# Patient Record
Sex: FEMALE | Race: WHITE | Employment: FULL TIME | URBAN - METROPOLITAN AREA
[De-identification: names, ages, dates, MRNs, and addresses within clinical notes are randomized per-mention and may not be internally consistent; named-entity substitution may affect disease eponyms.]

---

## 2022-09-25 ENCOUNTER — APPOINTMENT (OUTPATIENT)
Dept: GENERAL RADIOLOGY | Age: 37
DRG: 164 | End: 2022-09-25
Payer: COMMERCIAL

## 2022-09-25 ENCOUNTER — HOSPITAL ENCOUNTER (INPATIENT)
Age: 37
LOS: 2 days | Discharge: HOME | DRG: 164 | End: 2022-09-27
Attending: EMERGENCY MEDICINE | Admitting: INTERNAL MEDICINE
Payer: COMMERCIAL

## 2022-09-25 DIAGNOSIS — I26.99 BILATERAL PULMONARY EMBOLISM (HCC): Primary | ICD-10-CM

## 2022-09-25 PROBLEM — F17.200 SMOKER: Status: ACTIVE | Noted: 2022-09-25

## 2022-09-25 PROBLEM — F41.9 ANXIETY: Status: ACTIVE | Noted: 2022-09-25

## 2022-09-25 PROBLEM — G47.33 OBSTRUCTIVE SLEEP APNEA SYNDROME: Status: ACTIVE | Noted: 2022-09-25

## 2022-09-25 PROBLEM — E66.01 CLASS 3 SEVERE OBESITY IN ADULT (HCC): Status: ACTIVE | Noted: 2022-09-25

## 2022-09-25 LAB
ABSOLUTE EOS #: 0 K/UL (ref 0–0.4)
ABSOLUTE IMMATURE GRANULOCYTE: 0.11 K/UL (ref 0–0.3)
ABSOLUTE LYMPH #: 5.4 K/UL (ref 1–4.8)
ABSOLUTE MONO #: 0.53 K/UL (ref 0.1–0.8)
BASOPHILS # BLD: 0 % (ref 0–2)
BASOPHILS ABSOLUTE: 0 K/UL (ref 0–0.2)
EOSINOPHILS RELATIVE PERCENT: 0 % (ref 1–4)
HCT VFR BLD CALC: 40.6 % (ref 36.3–47.1)
HEMOGLOBIN: 13.4 G/DL (ref 11.9–15.1)
IMMATURE GRANULOCYTES: 1 %
LYMPHOCYTES # BLD: 51 % (ref 24–44)
MCH RBC QN AUTO: 27.9 PG (ref 25.2–33.5)
MCHC RBC AUTO-ENTMCNC: 33 G/DL (ref 28.4–34.8)
MCV RBC AUTO: 84.6 FL (ref 82.6–102.9)
MONOCYTES # BLD: 5 % (ref 1–7)
MORPHOLOGY: NORMAL
NRBC AUTOMATED: 0 PER 100 WBC
PARTIAL THROMBOPLASTIN TIME: 101.1 SEC (ref 20.5–30.5)
PARTIAL THROMBOPLASTIN TIME: 38.4 SEC (ref 20.5–30.5)
PARTIAL THROMBOPLASTIN TIME: 43.4 SEC (ref 20.5–30.5)
PDW BLD-RTO: 12.8 % (ref 11.8–14.4)
PLATELET # BLD: 447 K/UL (ref 138–453)
PMV BLD AUTO: 8.9 FL (ref 8.1–13.5)
PRO-BNP: 35 PG/ML
RBC # BLD: 4.8 M/UL (ref 3.95–5.11)
SEG NEUTROPHILS: 43 % (ref 36–66)
SEGMENTED NEUTROPHILS ABSOLUTE COUNT: 4.56 K/UL (ref 1.8–7.7)
TROPONIN, HIGH SENSITIVITY: 8 NG/L (ref 0–14)
WBC # BLD: 10.6 K/UL (ref 3.5–11.3)

## 2022-09-25 PROCEDURE — 93005 ELECTROCARDIOGRAM TRACING: CPT

## 2022-09-25 PROCEDURE — 99223 1ST HOSP IP/OBS HIGH 75: CPT | Performed by: INTERNAL MEDICINE

## 2022-09-25 PROCEDURE — 99222 1ST HOSP IP/OBS MODERATE 55: CPT | Performed by: SURGERY

## 2022-09-25 PROCEDURE — 96376 TX/PRO/DX INJ SAME DRUG ADON: CPT

## 2022-09-25 PROCEDURE — 6360000002 HC RX W HCPCS: Performed by: STUDENT IN AN ORGANIZED HEALTH CARE EDUCATION/TRAINING PROGRAM

## 2022-09-25 PROCEDURE — 99285 EMERGENCY DEPT VISIT HI MDM: CPT

## 2022-09-25 PROCEDURE — 85025 COMPLETE CBC W/AUTO DIFF WBC: CPT

## 2022-09-25 PROCEDURE — 2580000003 HC RX 258

## 2022-09-25 PROCEDURE — 2060000000 HC ICU INTERMEDIATE R&B

## 2022-09-25 PROCEDURE — 36415 COLL VENOUS BLD VENIPUNCTURE: CPT

## 2022-09-25 PROCEDURE — 85730 THROMBOPLASTIN TIME PARTIAL: CPT

## 2022-09-25 PROCEDURE — 71046 X-RAY EXAM CHEST 2 VIEWS: CPT

## 2022-09-25 PROCEDURE — 96365 THER/PROPH/DIAG IV INF INIT: CPT

## 2022-09-25 PROCEDURE — 84484 ASSAY OF TROPONIN QUANT: CPT

## 2022-09-25 PROCEDURE — 83880 ASSAY OF NATRIURETIC PEPTIDE: CPT

## 2022-09-25 RX ORDER — SODIUM CHLORIDE 0.9 % (FLUSH) 0.9 %
5-40 SYRINGE (ML) INJECTION PRN
Status: DISCONTINUED | OUTPATIENT
Start: 2022-09-25 | End: 2022-09-27 | Stop reason: HOSPADM

## 2022-09-25 RX ORDER — OMEPRAZOLE 40 MG/1
40 CAPSULE, DELAYED RELEASE ORAL
COMMUNITY
Start: 2022-08-19

## 2022-09-25 RX ORDER — VENLAFAXINE HYDROCHLORIDE 75 MG/1
225 CAPSULE, EXTENDED RELEASE ORAL
COMMUNITY
Start: 2022-07-11

## 2022-09-25 RX ORDER — BUSPIRONE HYDROCHLORIDE 15 MG/1
15 TABLET ORAL
COMMUNITY
Start: 2022-08-10

## 2022-09-25 RX ORDER — ACETAMINOPHEN 325 MG/1
650 TABLET ORAL EVERY 6 HOURS PRN
Status: DISCONTINUED | OUTPATIENT
Start: 2022-09-25 | End: 2022-09-27 | Stop reason: HOSPADM

## 2022-09-25 RX ORDER — SODIUM CHLORIDE 9 MG/ML
INJECTION, SOLUTION INTRAVENOUS PRN
Status: DISCONTINUED | OUTPATIENT
Start: 2022-09-25 | End: 2022-09-27 | Stop reason: HOSPADM

## 2022-09-25 RX ORDER — LAMOTRIGINE 150 MG/1
150 TABLET ORAL
COMMUNITY
Start: 2022-07-11

## 2022-09-25 RX ORDER — HEPARIN SODIUM 1000 [USP'U]/ML
80 INJECTION, SOLUTION INTRAVENOUS; SUBCUTANEOUS PRN
Status: DISCONTINUED | OUTPATIENT
Start: 2022-09-25 | End: 2022-09-26

## 2022-09-25 RX ORDER — HEPARIN SODIUM 1000 [USP'U]/ML
80 INJECTION, SOLUTION INTRAVENOUS; SUBCUTANEOUS ONCE
Status: DISCONTINUED | OUTPATIENT
Start: 2022-09-25 | End: 2022-09-25

## 2022-09-25 RX ORDER — ONDANSETRON 2 MG/ML
4 INJECTION INTRAMUSCULAR; INTRAVENOUS EVERY 6 HOURS PRN
Status: DISCONTINUED | OUTPATIENT
Start: 2022-09-25 | End: 2022-09-27 | Stop reason: HOSPADM

## 2022-09-25 RX ORDER — HEPARIN SODIUM 1000 [USP'U]/ML
40 INJECTION, SOLUTION INTRAVENOUS; SUBCUTANEOUS PRN
Status: DISCONTINUED | OUTPATIENT
Start: 2022-09-25 | End: 2022-09-26

## 2022-09-25 RX ORDER — HEPARIN SODIUM AND DEXTROSE 10000; 5 [USP'U]/100ML; G/100ML
5-30 INJECTION INTRAVENOUS CONTINUOUS
Status: DISCONTINUED | OUTPATIENT
Start: 2022-09-25 | End: 2022-09-26

## 2022-09-25 RX ORDER — ACETAMINOPHEN 650 MG/1
650 SUPPOSITORY RECTAL EVERY 6 HOURS PRN
Status: DISCONTINUED | OUTPATIENT
Start: 2022-09-25 | End: 2022-09-27 | Stop reason: HOSPADM

## 2022-09-25 RX ORDER — ONDANSETRON 4 MG/1
4 TABLET, ORALLY DISINTEGRATING ORAL EVERY 8 HOURS PRN
Status: DISCONTINUED | OUTPATIENT
Start: 2022-09-25 | End: 2022-09-27 | Stop reason: HOSPADM

## 2022-09-25 RX ORDER — POLYETHYLENE GLYCOL 3350 17 G/17G
17 POWDER, FOR SOLUTION ORAL DAILY PRN
Status: DISCONTINUED | OUTPATIENT
Start: 2022-09-25 | End: 2022-09-27 | Stop reason: HOSPADM

## 2022-09-25 RX ORDER — SODIUM CHLORIDE 0.9 % (FLUSH) 0.9 %
5-40 SYRINGE (ML) INJECTION EVERY 12 HOURS SCHEDULED
Status: DISCONTINUED | OUTPATIENT
Start: 2022-09-25 | End: 2022-09-27 | Stop reason: HOSPADM

## 2022-09-25 RX ADMIN — Medication 15 UNITS/KG/HR: at 07:17

## 2022-09-25 RX ADMIN — Medication 17 UNITS/KG/HR: at 20:51

## 2022-09-25 RX ADMIN — Medication 18 UNITS/KG/HR: at 05:36

## 2022-09-25 RX ADMIN — HEPARIN SODIUM 4720 UNITS: 1000 INJECTION INTRAVENOUS; SUBCUTANEOUS at 23:25

## 2022-09-25 RX ADMIN — SODIUM CHLORIDE, PRESERVATIVE FREE 10 ML: 5 INJECTION INTRAVENOUS at 17:05

## 2022-09-25 RX ADMIN — SODIUM CHLORIDE, PRESERVATIVE FREE 10 ML: 5 INJECTION INTRAVENOUS at 23:25

## 2022-09-25 RX ADMIN — HEPARIN SODIUM 4720 UNITS: 1000 INJECTION INTRAVENOUS; SUBCUTANEOUS at 17:01

## 2022-09-25 ASSESSMENT — ENCOUNTER SYMPTOMS
GASTROINTESTINAL NEGATIVE: 1
SHORTNESS OF BREATH: 1
EYES NEGATIVE: 1
ABDOMINAL PAIN: 0
NAUSEA: 0
VOMITING: 0
COUGH: 0
BACK PAIN: 1
SHORTNESS OF BREATH: 0

## 2022-09-25 NOTE — CARE COORDINATION
09/25/22 3610   Service Assessment   Patient Orientation Alert and Oriented;Person;Place;Situation   Cognition Alert   History Provided By Patient   Primary Caregiver Self   Accompanied By/Relationship sister [address] [place] [address] Parent; Family Members   Patient's [address] is: Legal Next of Lily Ayon   PCP Verified by CM Yes  Nikki Heimlich)   Last Visit to PCP Within last two years   Prior Functional Level Independent in ADLs/IADLs   Current Functional Level Independent in ADLs/IADLs   Can patient return to prior living arrangement Yes   Ability to make needs known: Good   Family able to assist with home care needs: Yes   Would you like for me to discuss the discharge plan with any other family members/significant others, and if so, who? Yes  (brother Zoila Lema, sister Sagrario Jay)   Social/Functional History   Lives With Family   Type of Home Apartment  (currently lives with brother in [facility], her apt. is in [place])   Bathroom Shower/Tub Tub/Shower unit;Curtain   [address]; Wheelchair-manual  (CPAP)   Receives Help From Family   ADL Assistance Independent   Ambulation Assistance Needs assistance   Transfer Assistance Needs assistance   Active  Yes   Mode of Transportation Smart Plate   Occupation Full time employment; Other(comment)  (on Mdical Leave at this time)   Type of Occupation Factory   Discharge Planning   Type of Residence Apartment   Living Arrangements Family Members   Current Services Prior To Admission [address]   DME Crutches; Wheelchair;Cpap   DME Ordered?  No   Type of Home Care Services None   Patient expects to be discharged to:   (brother's home)   Services At/After Discharge   Transition of Care Consult (CM Consult) Discharge Planning   Mode of Transport at Discharge   (sister to transport her home)   Confirm Follow Up Transport Family   Condition of Participation: Discharge Planning   The Plan for Transition of Care is related to the following treatment goals: breathe easier   The Patient and/or Patient Representative was provided with a Choice of Provider? Patient   The Patient and/Or Patient Representative agree with the Discharge Plan? Yes   Freedom of Choice list was provided with basic dialogue that supports the patient's individualized plan of care/goals, treatment preferences, and shares the quality data associated with the providers? Yes   Transitional planning-talked with patient. Plan is to go home to her brother's home. Has ride. Verified address, emergency contacts, and insurance.

## 2022-09-25 NOTE — ED NOTES
Pt resting on stretcher, in NAD, RR even and unlabored. Pt updated on plan of care. Will continue plan of care. Call light within reach.        Sera Liriano RN  09/25/22 5039

## 2022-09-25 NOTE — CONSULTS
Division of Vascular Surgery  Vascular Consult      Name: Giancarlo Luna  MRN: [id number]       Physician Requesting Consult:  Dr. Justine Buitrago    Reason for Consult:   Bilateral PE  Chief Complaint:     Right lateral chest wall pain with inspiration  Neck pain     History of Present Illness:      Giancarlo Luna is a 42 y.o.  female who presents with concerns for neck discomfort and right inferior lateral chest wall pain with inspiration. This started yesterday. CT PE study at outlying facility shows unchanged bilateral PE (from 1 week prior). No evidence of heart strain. BNP 35. Trop 8.     5 weeks ago she had ankle surgery and last weekend was diagnosed with an acute bilateral saddle PE. She states she started feeling fatigued and a pressure type sensation which prompted her to go to the doctor. She had an EKOS procedure performed and was discharged home on Eliquis. She states she has been taking her prescription twice daily as instructed. Past Medical History:     Past Medical History:   Diagnosis Date    Pulmonary embolism ([address]       Past Surgical History:     History reviewed. No pertinent surgical history. Medications Prior to Admission:       Prior to Admission medications    Medication Sig Start Date End Date Taking? Authorizing Provider   venlafaxine (EFFEXOR XR) 75 MG extended release capsule 225 mg 7/11/22  Yes Historical Provider, MD   omeprazole (PRILOSEC) 40 MG delayed release capsule 40 mg 8/19/22  Yes Historical Provider, MD   lamoTRIgine (LAMICTAL) 150 MG tablet 150 mg 7/11/22  Yes Historical Provider, MD   busPIRone (BUSPAR) 15 MG tablet 15 mg 8/10/22  Yes Historical Provider, MD   apixaban (ELIQUIS) 5 MG TABS tablet Take by mouth 2 times daily   Yes Historical Provider, MD        Allergies:       Aripiprazole    Social History:     Tobacco:    reports that she has never smoked. She has never used smokeless tobacco.  Alcohol:      has no history on file for alcohol use.   Drug Use:  reports no history of drug use. Family History:     History reviewed. No pertinent family history. Review of Systems:     Constitutional:  negative for  fevers, chills, sweats, fatigue  HEENT:  negative for vision or hearing changes  Respiratory:  negative for shortness of breath, cough, or congestion  Cardiovascular:  negative for  chest pain, palpitations  Gastrointestinal:  negative for nausea, vomiting, abdominal pain  Genitourinary:  negative for frequency, dysuria  Integument:  negative for rash, skin lesions  Chest/Breast:  Positive for right chest wall pain with inspiration. No painful inspiration or expiration  Musculoskeletal: Positive for neck pain, negative for acute joint pain  Neurological:  negative for headaches, numbness, pain and tingling extremities  Lymphatics: no lymphadenopathy or painful masses  Behavior/Psych:  negative for depression and anxiety    Physical Exam:     Vitals:  /76   Pulse 79   Temp 98.4 °F (36.9 °C) (Oral)   Resp 18   Wt 260 lb (117.9 kg)   SpO2 96%     General appearance - alert, well appearing and in no acute distress  Mental status - oriented to person, place and time with normal affect  Head - normocephalic and atraumatic  Eyes - pupils equal and reactive, extraocular eye movements intact, conjunctiva clear  Ears - hearing appears to be intact  Nose - no drainage noted  Mouth - mucous membranes moist  Neck - supple, trachea midline   Chest -  normal effort, no respiratory distress, no accessory muscle use, no wheezing  Heart - normal rate, regular rhythm, no murmurs  Abdomen - soft, non-tender, non-distended, no guarding or peritoneal signs   Neurological - normal speech, no focal findings or movement disorder noted, cranial nerves II through XII grossly intact  Extremities - no pedal edema or calf pain with palpation  Skin - no gross lesions, rashes, or induration noted    Labs   Labs are always being added and updated.   At the time of this note pertinent and available labs have been reviewed. Imaging:   CT PE from [facility] reviewed    Assessment:     Dylan Ramirez is a 42 y.o.  female with bilateral pulmonary embolism   S/[facility]OS at [facility] facility  On Eliqu   Presents with right lateral chest wall pain with inspiration    Plan: Will review CT imaging with the attending to see if the patient would benefit from any further vascular surgical interventions. Clinically, she is SATing mid 80's on RA and conversing without issues. No signs of heart strain on CT. On heparin gtt, continue for now.  Will need to be discharged on oral AC.     ----------------------------------------  Rob Clamp, DO  General Surgery PGY-4

## 2022-09-25 NOTE — ED PROVIDER NOTES
[facility] ED  Emergency Department  Emergency Medicine Resident Sign-out     Care of [facility] was assumed from Dr. Irish Kapoor and is being seen for Pulmonary Embolism  . The patient's initial evaluation and plan have been discussed with the prior provider who initially evaluated the patient. EMERGENCY DEPARTMENT COURSE / MEDICAL DECISION MAKING:       MEDICATIONS GIVEN:  Orders Placed This Encounter   Medications    DISCONTD: heparin (porcine) injection 9,430 Units    heparin (porcine) injection 9,430 Units    heparin (porcine) injection 4,720 Units    heparin 25,000 units in dextrose 5 % 250 mL infusion (rate based)       LABS / RADIOLOGY:     Labs Reviewed   APTT - Abnormal; Notable for the following components:       Result Value    .1 (*)     All other components within normal limits   CBC WITH AUTO DIFFERENTIAL - Abnormal; Notable for the following components:    Immature Granulocytes 1 (*)     Lymphocytes 51 (*)     Eosinophils % 0 (*)     Absolute Lymph # 5.40 (*)     All other components within normal limits   TROPONIN   BRAIN NATRIURETIC PEPTIDE   APTT   APTT       No results found. RECENT VITALS:     Temp: 98.4 °F (36.9 °C),  Heart Rate: 70, Resp: 18, BP: (!) 118/41, SpO2: 96 %    This patient is a 42 y.o. Female who presents as transfer from [facility] for PE. Patient with tib/fib fracture 1 month ago. Developed PE 9/19 at which time she underwent EKOS procedure and was discharged on Eliquis. Has been compliant on Eliquis. Yesterday had cramping upper back pain and was seen in the ED where she was found to have bilateral PE, unchanged from previous per radiology at [facility]. Started on heparin drip. Supratherapeutic here so heparin drip held per protocol. Vascular surgery consulted, awaiting final recommendations. Vascular surgery evaluated patient. Planning on a stat echo today and taking patient to the OR tomorrow.   Requesting medicine admit. Spoke to medicine, they agree to admit patient to stepdown. OUTSTANDING TASKS / RECOMMENDATIONS:    Follow-up vascular recommendations  Admission     FINAL IMPRESSION:     1. Bilateral pulmonary embolism (HCC)        DISPOSITION:         DISPOSITION:  []  Discharge   []  Transfer -    [x]  Admission -  Internal Medicine   []  Against Medical Advice   []  Eloped   FOLLOW-UP: No follow-up provider specified.    DISCHARGE MEDICATIONS: New Prescriptions    No medications on file           Heather Fallon DO  Emergency Medicine Resident  [place], [place]  Resident  09/25/22 4192

## 2022-09-25 NOTE — ED TRIAGE NOTES
PT presents to ED as transfer from [facility] for Bilateral PE.  MO arrived on heprin in [facility] PT A&O x4

## 2022-09-25 NOTE — ED PROVIDER NOTES
[facility] ED  Emergency Department Encounter  Emergency Medicine Resident     Pt Name: Carlos Johnson  GIF:[id number]  [place] 1985  Date of evaluation: 9/25/22  PCP:  No primary care provider on file. CHIEF COMPLAINT       Chief Complaint   Patient presents with    Pulmonary Embolism     HISTORY OF PRESENT ILLNESS  (Location/Symptom, Timing/Onset, Context/Setting, Quality, Duration, ModifyingFactors, Severity.)      Carlos Johnson is a 42 y.o. female who presents as transfer from [facility] due to bilateral PEs. Patient had right tib-fib fracture 1 month ago s/p ORIF 8/24 at [facility]. Found to have saddle PE 9/17 and admitted for EKOS procedure. Started on Eliquis and discharged 9/21. Has been compliant with Eliquis since discharge. Today had muscle spasming and cramping of upper back so return to the ED. Repeat CT showed continued bilateral PE. Per radiologist report unchanged from previous. Started on heparin drip and transferred for evaluation by vascular surgery. Denies chest pain, shortness of breath, leg swelling. PAST MEDICAL / SURGICAL / SOCIAL /FAMILY HISTORY      has a past medical history of Pulmonary embolism ([address] No other pertinent PMH on review with patient/guardian. has no past surgical history on file. No other pertinent PSH on review with patient/guardian.   Social History     Socioeconomic History    Marital status: Single     Spouse name: Not on file    Number of children: Not on file    Years of education: Not on file    Highest education level: Not on file   Occupational History    Not on file   Tobacco Use    Smoking status: Never    Smokeless tobacco: Never   Vaping Use    Vaping Use: Not on file   Substance and Sexual Activity    Alcohol use: Not on file    Drug use: Never    Sexual activity: Never   Other Topics Concern    Not on file   Social History Narrative    Not on file     Social Determinants of Health     Financial Resource Strain: Not on file   Food Insecurity: Not on file   Transportation Needs: Not on file   Physical Activity: Not on file   Stress: Not on file   Social Connections: Not on file   Intimate Partner Violence: Not on file   Housing Stability: Not on file       I counseled the patient against using tobacco products. History reviewed. No pertinent family history. No other pertinent FamHx on review with patient/guardian. Allergies:  Aripiprazole    Home Medications:  Prior to Admission medications    Medication Sig Start Date End Date Taking? Authorizing Provider   venlafaxine (EFFEXOR XR) 75 MG extended release capsule 225 mg 7/11/22  Yes Historical Provider, MD   omeprazole (PRILOSEC) 40 MG delayed release capsule 40 mg 8/19/22  Yes Historical Provider, MD   lamoTRIgine (LAMICTAL) 150 MG tablet 150 mg 7/11/22  Yes Historical Provider, MD   busPIRone (BUSPAR) 15 MG tablet 15 mg 8/10/22  Yes Historical Provider, MD   apixaban (ELIQUIS) 5 MG TABS tablet Take by mouth 2 times daily   Yes Historical Provider, MD       REVIEW OF SYSTEMS    (2-9 systems for level 4, 10 ormore for level 5)      Review of Systems   Constitutional:  Negative for fever. Eyes:  Negative for visual disturbance. Respiratory:  Negative for cough and shortness of breath. Cardiovascular:  Negative for chest pain, palpitations and leg swelling. Gastrointestinal:  Negative for abdominal pain, nausea and vomiting. Musculoskeletal:  Positive for back pain. Skin:  Negative for rash. Allergic/Immunologic: Negative for immunocompromised state. Neurological:  Negative for headaches. Hematological:  Does not bruise/bleed easily. PHYSICAL EXAM   (up to 7 for level 4, 8 or more for level 5)      INITIAL VITALS:   /76   Pulse 79   Temp 98.4 °F (36.9 °C) (Oral)   Resp 18   Wt 260 lb (117.9 kg)   SpO2 96%     Physical Exam  Constitutional:       General: She is not in acute distress. Appearance: Normal appearance. 82.6 - 102.9 fL    MCH 27.9 25.2 - 33.5 pg    MCHC 33.0 28.4 - 34.8 g/dL    RDW 12.8 11.8 - 14.4 %    Platelets 620 907 - 494 k/uL    MPV 8.9 8.1 - 13.5 fL    NRBC Automated 0.0 0.0 per 100 WBC    Seg Neutrophils PENDING %    Lymphocytes PENDING %    Monocytes PENDING %    Eosinophils % PENDING %    Basophils PENDING %    Immature Granulocytes PENDING 0 %    Segs Absolute PENDING k/uL    Absolute Lymph # PENDING k/uL    Absolute Mono # PENDING k/uL    Absolute Eos # PENDING k/uL    Basophils Absolute PENDING 0.0 - 0.2 k/uL    Absolute Immature Granulocyte PENDING 0.00 - 0.30 k/uL       IMPRESSION/MDM/ED COURSE:  42 y.o. female presented as transfer from [facility] due to bilateral PEs. Vitals WNL. On exam patient resting calmly no acute distress, nontoxic-appearing. Heart RRR, lungs clear. No respiratory distress. Vascular resident at bedside on arrival.  They will review imaging and discuss with their attending. Initial troponin at [facility] negative. Lab work there were reviewed and largely unremarkable. Will repeat troponin and obtain BNP. Pending vascular recommendations    ED Course as of 09/25/22 0711   Laura Rothman Sep 25, 2022   0612 APTT(!!):    .1(!!)  Heparin stopped.  [AF]   0612 CBC with Auto Differential:    WBC 10.6   RBC 4.80   Hemoglobin Quant 13.4   Hematocrit 40.6   MCV 84.6   MCH 27.9   MCHC 33.0   RDW 12.8   Platelet Count 897   MPV 8.9   NRBC Automated 0.0   Seg Neutrophils PENDING   Lymphocytes PENDING   Monocytes PENDING   Eosinophils % PENDING   Basophils PENDING   Immature Granulocytes PENDING   Segs Absolute PENDING   Absolute Lymph # PENDING   Absolute Mono # PENDING   Absolute Eos # PENDING   Basophils Absolute PENDING   Absolute Immature Granulocyte PENDING [AF]   0612 Brain Natriuretic Peptide:    Pro-BNP 35 [AF]   0612 Troponin:    Troponin, High Sensitivity 8 [AF]   0711 Brain Natriuretic Peptide:    Pro-BNP 35 [AF]   5083 Patient signed out to Dr. Ariel Ruano pending vascular recommendations [AF]      ED Course User Index  [AF] Vinita Herrera DO         RADIOLOGY:  No orders to display       EKG  EKG Interpretation    Interpreted by me    Rhythm: normal sinus   Rate: normal  Axis: normal  Ectopy: none  Conduction: normal  ST Segments: no acute change  T Waves: no acute change  Q Waves: none    Clinical Impression: no acute changes and normal EKG    All EKG's are interpreted by the Emergency Department Physician who either signs or Co-signs this chart in the absence of a cardiologist.    PROCEDURES:  None    CONSULTS:  IP CONSULT TO VASCULAR SURGERY    FINAL IMPRESSION      1. Bilateral pulmonary embolism ([address]          DISPOSITION / PLAN     DISPOSITION Decision To Admit 09/25/2022 05:45:05 AM    PATIENT REFERREDTO:  No follow-up provider specified.     DISCHARGE MEDICATIONS:  New Prescriptions    No medications on file       Albina Casas DO  PGY 3  Resident Physician Emergency Medicine  09/25/22 7:11 AM    (Please note that portions of this note were completed with a voice recognition program.Efforts were made to edit the dictations but occasionally words are mis-transcribed.)       Vinita Herrera DO  Resident  09/25/22 0065

## 2022-09-25 NOTE — ED PROVIDER NOTES
[facility]     Emergency Department     Faculty Attestation    I performed a history and physical examination of the patient and discussed management with the resident. I reviewed the residents note and agree with the documented findings and plan of care. Any areas of disagreement are noted on the chart. I was personally present for the key portions of any procedures. I have documented in the chart those procedures where I was not present during the key portions. I have reviewed the emergency nurses triage note. I agree with the chief complaint, past medical history, past surgical history, allergies, medications, social and family history as documented unless otherwise noted below. For Physician Assistant/ Nurse Practitioner cases/documentation I have personally evaluated this patient and have completed at least one if not all key elements of the E/M (history, physical exam, and MDM). Additional findings are as noted. I have personally seen and evaluated the patient. I find the patient's history and physical exam are consistent with the NP/PA documentation. I agree with the care provided, treatment rendered, disposition and follow-up plan. 49-year-old female transferred from [facility] for bilateral PEs. Patient was recently diagnosed with saddle PE, underwent EKOS at [facility]. PEs were provoked after right tib-fib injury with immobilization. Patient was feeling improved after EKOS, discharged on Eliquis. Started having shoulder spasms and difficulty breathing tonight. Went back to [facility], and CT showed bilateral PEs. Patient transferred for evaluation. Exam:  General: Sitting on the bed, awake, alert, and in no acute distress  CV: normal rate and regular rhythm  Lungs: Breathing comfortably on room air with no tachypnea, hypoxia, or increased work of breathing    Plan:  Cardiac enzymes without evidence of right heart strain.   Will consult vascular surgery with concern for failure of DOAC  Continue heparin drip, admit for further management    EKG: Interpreted by myself: EKG with normal sinus rhythm, 80 bpm.  Normal axis. No ST segment elevation or depression. Normal intervals. T wave inversion in lead V1, flattening in lead III. Nonspecific EKG. No prior available for comparison.     Michael Hu MD   Attending Emergency Physician    (Please note that portions of this note were completed with a voice recognition program. Efforts were made to edit the dictations but occasionally words are mis-transcribed.)            Michael Hu MD  09/25/22 3314

## 2022-09-25 NOTE — H&P
will get stat echo. Review of Systems   Constitutional: Negative. HENT: Negative. Eyes: Negative. Respiratory:  Positive for shortness of breath. Cardiovascular:  Positive for chest pain (with inspiration on Rt). Gastrointestinal: Negative. Endocrine: Negative. Genitourinary: Negative. Musculoskeletal:  Positive for back pain. Skin: Negative. Neurological: Negative. Psychiatric/Behavioral: Negative. PAST MEDICAL HISTORY     Past Medical History:   Diagnosis Date    Pulmonary embolism ([facility] [address]        PAST SURGICAL HISTORY     History reviewed. No pertinent surgical history. ALLERGIES     Aripiprazole    MEDICATIONS PRIOR TO ADMISSION     Prior to Admission medications    Medication Sig Start Date End Date Taking? Authorizing Provider   venlafaxine (EFFEXOR XR) 75 MG extended release capsule 225 mg 22  Yes Historical Provider, MD   omeprazole (PRILOSEC) 40 MG delayed release capsule 40 mg 22  Yes Historical Provider, MD   lamoTRIgine (LAMICTAL) 150 MG tablet 150 mg 22  Yes Historical Provider, MD   busPIRone (BUSPAR) 15 MG tablet 15 mg 8/10/22  Yes Historical Provider, MD   apixaban (ELIQUIS) 5 MG TABS tablet Take by mouth 2 times daily   Yes Historical Provider, MD       SOCIAL HISTORY     Tobacco: Never. vape occassionally  Alcohol: None  Illicits: None  Occupation: Not on file    FAMILY HISTORY     History reviewed. No pertinent family history. PHYSICAL EXAM     Vitals: BP (!) 118/41   Pulse 70   Temp 98.4 °F (36.9 °C) (Oral)   Resp 18   Wt 260 lb (117.9 kg)   SpO2 96%   Tmax: Temp (24hrs), Av.4 °F (36.9 °C), Min:98.4 °F (36.9 °C), Max:98.4 °F (36.9 °C)    Last Body weight:   Wt Readings from Last 3 Encounters:   22 260 lb (117.9 kg)     Body Mass Index : There is no height or weight on file to calculate BMI. Physical Exam  Constitutional:       Appearance: She is obese. HENT:      Head: Normocephalic and atraumatic.       Nose: Nose normal.      Mouth/Throat:      Mouth: Mucous membranes are moist.      Pharynx: Oropharynx is clear. Eyes:      Extraocular Movements: Extraocular movements intact. Conjunctiva/sclera: Conjunctivae normal.      Pupils: Pupils are equal, round, and reactive to light. Cardiovascular:      Rate and Rhythm: Normal rate and regular rhythm. Pulses: Normal pulses. Heart sounds: Normal heart sounds. Pulmonary:      Breath sounds: No decreased air movement. Examination of the right-middle field reveals rhonchi. Examination of the left-middle field reveals rhonchi. Examination of the right-lower field reveals decreased breath sounds. Examination of the left-lower field reveals rhonchi. Decreased breath sounds, rhonchi and rales present. Abdominal:      General: Bowel sounds are normal.      Palpations: Abdomen is soft. Musculoskeletal:         General: Normal range of motion. Cervical back: Normal range of motion and neck supple. Thoracic back: Spasms (upper back and shoulders) present. Right lower leg: Deformity (Surgical scar) present. Neurological:      General: No focal deficit present. Mental Status: She is alert and oriented to person, place, and time.             INVESTIGATIONS     Laboratory Testing:     Recent Results (from the past 24 hour(s))   APTT    Collection Time: 09/25/22  5:37 AM   Result Value Ref Range    .1 (HH) 20.5 - 30.5 sec   Troponin    Collection Time: 09/25/22  5:37 AM   Result Value Ref Range    Troponin, High Sensitivity 8 0 - 14 ng/L   Brain Natriuretic Peptide    Collection Time: 09/25/22  5:37 AM   Result Value Ref Range    Pro-BNP 35 <300 pg/mL   CBC with Auto Differential    Collection Time: 09/25/22  5:37 AM   Result Value Ref Range    WBC 10.6 3.5 - 11.3 k/uL    RBC 4.80 3.95 - 5.11 m/uL    Hemoglobin 13.4 11.9 - 15.1 g/dL    Hematocrit 40.6 36.3 - 47.1 %    MCV 84.6 82.6 - 102.9 fL    MCH 27.9 25.2 - 33.5 pg    MCHC 33.0 28.4 - 34.8 g/dL    RDW 12.8 11.8 - 14.4 %    Platelets 563 291 - 234 k/uL    MPV 8.9 8.1 - 13.5 fL    NRBC Automated 0.0 0.0 per 100 WBC    Immature Granulocytes 1 (H) 0 %    Seg Neutrophils 43 36 - 66 %    Lymphocytes 51 (H) 24 - 44 %    Monocytes 5 1 - 7 %    Eosinophils % 0 (L) 1 - 4 %    Basophils 0 0 - 2 %    Absolute Immature Granulocyte 0.11 0.00 - 0.30 k/uL    Segs Absolute 4.56 1.8 - 7.7 k/uL    Absolute Lymph # 5.40 (H) 1.0 - 4.8 k/uL    Absolute Mono # 0.53 0.1 - 0.8 k/uL    Absolute Eos # 0.00 0.0 - 0.4 k/uL    Basophils Absolute 0.00 0.0 - 0.2 k/uL    Morphology Normal        Imaging:   No results found. ASSESSMENT & PLAN     ASSESSMENT / PLAN:     IMPRESSION  This is a 42 y.o. female who presented with shortness of breath and chest pain and found to have bilateral PEs. Patient admitted for management and surgical intervention for bilateral PEs. Pulmonary embolism, bilateral ([address]  - Initial saddle embolus on 09/19 s/p ORIF for right tibia-fibular fracture  - Was on Eliquis 5 mg twice daily post PE for last 1 week  - Repeat CT significant for bilateral PEs unchanged from previous scans  - Chest x-ray shows mild elevation of the right hemidiaphragm and mild right basilar atelectasis. - Started on heparin drip  - Vascular surgery on board, ordered echo and planning on surgical intervention tomorrow morning. We will follow further recommendations from them  - Monitor oxygen saturation with pulse oximetry  - Monitor hemodynamics    Hx of TAMRA  - Diagnosed 2018  - Has CPAP at home but does not use      Anxiety  - As per patient, she use buspirone, venlafaxine and lamotrigine at home  - Will confirm complete psych history and resume medications accordingly. Diet: Regular adult diet.  N.p.o. from midnight  DVT ppx: Heparin drip  GI ppx: Not indicated    PT/OT/SW: Consulted  Discharge Planning:  to assist with discharge planning    Gera Pascal MD  Internal Medicine Resident, PGY-1  [facility]. [facility];  [place], [place]  9/25/2022, 10:47 AM

## 2022-09-26 ENCOUNTER — ANESTHESIA EVENT (OUTPATIENT)
Dept: CARDIAC CATH/INVASIVE PROCEDURES | Age: 37
DRG: 164 | End: 2022-09-26
Payer: COMMERCIAL

## 2022-09-26 ENCOUNTER — ANESTHESIA (OUTPATIENT)
Dept: CARDIAC CATH/INVASIVE PROCEDURES | Age: 37
DRG: 164 | End: 2022-09-26
Payer: COMMERCIAL

## 2022-09-26 LAB
ABSOLUTE EOS #: 0.24 K/UL (ref 0–0.44)
ABSOLUTE IMMATURE GRANULOCYTE: 0.07 K/UL (ref 0–0.3)
ABSOLUTE LYMPH #: 3.26 K/UL (ref 1.1–3.7)
ABSOLUTE MONO #: 0.69 K/UL (ref 0.1–1.2)
ALBUMIN SERPL-MCNC: 3.5 G/DL (ref 3.5–5.2)
ALBUMIN/GLOBULIN RATIO: 1 (ref 1–2.5)
ALP BLD-CCNC: 90 U/L (ref 35–104)
ALT SERPL-CCNC: 16 U/L (ref 5–33)
ANION GAP SERPL CALCULATED.3IONS-SCNC: 8 MMOL/L (ref 9–17)
AST SERPL-CCNC: 11 U/L
BASOPHILS # BLD: 1 % (ref 0–2)
BASOPHILS ABSOLUTE: 0.09 K/UL (ref 0–0.2)
BILIRUB SERPL-MCNC: 0.3 MG/DL (ref 0.3–1.2)
BILIRUBIN DIRECT: 0.1 MG/DL
BILIRUBIN, INDIRECT: 0.2 MG/DL (ref 0–1)
BUN BLDV-MCNC: 12 MG/DL (ref 6–20)
CALCIUM SERPL-MCNC: 9.4 MG/DL (ref 8.6–10.4)
CHLORIDE BLD-SCNC: 102 MMOL/L (ref 98–107)
CO2: 24 MMOL/L (ref 20–31)
CREAT SERPL-MCNC: 0.74 MG/DL (ref 0.5–0.9)
EKG ATRIAL RATE: 80 BPM
EKG P AXIS: 38 DEGREES
EKG P-R INTERVAL: 150 MS
EKG Q-T INTERVAL: 398 MS
EKG QRS DURATION: 98 MS
EKG QTC CALCULATION (BAZETT): 459 MS
EKG R AXIS: 34 DEGREES
EKG T AXIS: 34 DEGREES
EKG VENTRICULAR RATE: 80 BPM
EOSINOPHILS RELATIVE PERCENT: 3 % (ref 1–4)
GFR AFRICAN AMERICAN: >60 ML/MIN
GFR NON-AFRICAN AMERICAN: >60 ML/MIN
GFR SERPL CREATININE-BSD FRML MDRD: ABNORMAL ML/MIN/{1.73_M2}
GLUCOSE BLD-MCNC: 109 MG/DL (ref 70–99)
HCG, PREGNANCY URINE (POC): NEGATIVE
HCT VFR BLD CALC: 40.5 % (ref 36.3–47.1)
HEMOGLOBIN: 13.2 G/DL (ref 11.9–15.1)
IMMATURE GRANULOCYTES: 1 %
INR BLD: 1
LACTIC ACID, WHOLE BLOOD: 1.4 MMOL/L (ref 0.7–2.1)
LV EF: 55 %
LVEF MODALITY: NORMAL
LYMPHOCYTES # BLD: 36 % (ref 24–43)
MCH RBC QN AUTO: 28 PG (ref 25.2–33.5)
MCHC RBC AUTO-ENTMCNC: 32.6 G/DL (ref 28.4–34.8)
MCV RBC AUTO: 85.8 FL (ref 82.6–102.9)
MONOCYTES # BLD: 8 % (ref 3–12)
NRBC AUTOMATED: 0 PER 100 WBC
PARTIAL THROMBOPLASTIN TIME: 44.5 SEC (ref 20.5–30.5)
PARTIAL THROMBOPLASTIN TIME: 45.8 SEC (ref 20.5–30.5)
PDW BLD-RTO: 12.9 % (ref 11.8–14.4)
PLATELET # BLD: 381 K/UL (ref 138–453)
PMV BLD AUTO: 8.6 FL (ref 8.1–13.5)
POTASSIUM SERPL-SCNC: 4.2 MMOL/L (ref 3.7–5.3)
PROTHROMBIN TIME: 10.4 SEC (ref 9.1–12.3)
RBC # BLD: 4.72 M/UL (ref 3.95–5.11)
SEG NEUTROPHILS: 51 % (ref 36–65)
SEGMENTED NEUTROPHILS ABSOLUTE COUNT: 4.64 K/UL (ref 1.5–8.1)
SODIUM BLD-SCNC: 134 MMOL/L (ref 135–144)
TOTAL PROTEIN: 7 G/DL (ref 6.4–8.3)
WBC # BLD: 9 K/UL (ref 3.5–11.3)

## 2022-09-26 PROCEDURE — 36014 PLACE CATHETER IN ARTERY: CPT

## 2022-09-26 PROCEDURE — 99232 SBSQ HOSP IP/OBS MODERATE 35: CPT | Performed by: INTERNAL MEDICINE

## 2022-09-26 PROCEDURE — C1753 CATH, INTRAVAS ULTRASOUND: HCPCS

## 2022-09-26 PROCEDURE — 93010 ELECTROCARDIOGRAM REPORT: CPT | Performed by: INTERNAL MEDICINE

## 2022-09-26 PROCEDURE — 2580000003 HC RX 258: Performed by: STUDENT IN AN ORGANIZED HEALTH CARE EDUCATION/TRAINING PROGRAM

## 2022-09-26 PROCEDURE — 80076 HEPATIC FUNCTION PANEL: CPT

## 2022-09-26 PROCEDURE — 02CR3ZZ EXTIRPATION OF MATTER FROM LEFT PULMONARY ARTERY, PERCUTANEOUS APPROACH: ICD-10-PCS | Performed by: SURGERY

## 2022-09-26 PROCEDURE — 81025 URINE PREGNANCY TEST: CPT

## 2022-09-26 PROCEDURE — 93306 TTE W/DOPPLER COMPLETE: CPT

## 2022-09-26 PROCEDURE — C1757 CATH, THROMBECTOMY/EMBOLECT: HCPCS

## 2022-09-26 PROCEDURE — 7100000000 HC PACU RECOVERY - FIRST 15 MIN

## 2022-09-26 PROCEDURE — 6360000002 HC RX W HCPCS: Performed by: STUDENT IN AN ORGANIZED HEALTH CARE EDUCATION/TRAINING PROGRAM

## 2022-09-26 PROCEDURE — 80048 BASIC METABOLIC PNL TOTAL CA: CPT

## 2022-09-26 PROCEDURE — 2720000010 HC SURG SUPPLY STERILE

## 2022-09-26 PROCEDURE — 10701625 HC MISC CATH THROMBECTOMY

## 2022-09-26 PROCEDURE — 85730 THROMBOPLASTIN TIME PARTIAL: CPT

## 2022-09-26 PROCEDURE — C1769 GUIDE WIRE: HCPCS

## 2022-09-26 PROCEDURE — 02CQ3ZZ EXTIRPATION OF MATTER FROM RIGHT PULMONARY ARTERY, PERCUTANEOUS APPROACH: ICD-10-PCS | Performed by: SURGERY

## 2022-09-26 PROCEDURE — 10701625 HC MISC GUIDEWIRE

## 2022-09-26 PROCEDURE — 2060000000 HC ICU INTERMEDIATE R&B

## 2022-09-26 PROCEDURE — 85610 PROTHROMBIN TIME: CPT

## 2022-09-26 PROCEDURE — B31SZZZ FLUOROSCOPY OF RIGHT PULMONARY ARTERY: ICD-10-PCS | Performed by: SURGERY

## 2022-09-26 PROCEDURE — B31TZZZ FLUOROSCOPY OF LEFT PULMONARY ARTERY: ICD-10-PCS | Performed by: SURGERY

## 2022-09-26 PROCEDURE — 10701625 HC MISC CATH INTRAVASCULAR ULTRASOUND

## 2022-09-26 PROCEDURE — 10701625 HC SURG SUPPLY STERILE

## 2022-09-26 PROCEDURE — 7100000001 HC PACU RECOVERY - ADDTL 15 MIN

## 2022-09-26 PROCEDURE — 37184 PRIM ART M-THRMBC 1ST VSL: CPT

## 2022-09-26 PROCEDURE — 85025 COMPLETE CBC W/AUTO DIFF WBC: CPT

## 2022-09-26 PROCEDURE — 10701625 HC NON-CHARGEABLE SUPPLY

## 2022-09-26 PROCEDURE — 75743 ARTERY X-RAYS LUNGS: CPT

## 2022-09-26 PROCEDURE — 6360000002 HC RX W HCPCS

## 2022-09-26 PROCEDURE — 83605 ASSAY OF LACTIC ACID: CPT

## 2022-09-26 PROCEDURE — 7100000010 HC PHASE II RECOVERY - FIRST 15 MIN: Performed by: SURGERY

## 2022-09-26 PROCEDURE — 36415 COLL VENOUS BLD VENIPUNCTURE: CPT

## 2022-09-26 PROCEDURE — 2500000003 HC RX 250 WO HCPCS

## 2022-09-26 PROCEDURE — 2709999900 HC NON-CHARGEABLE SUPPLY

## 2022-09-26 PROCEDURE — XXE3X27 MEASUREMENT OF PULMONARY ARTERY FLOW, COMPUTER-AIDED TRIAGE AND NOTIFICATION, NEW TECHNOLOGY GROUP 7: ICD-10-PCS | Performed by: SURGERY

## 2022-09-26 PROCEDURE — 7100000011 HC PHASE II RECOVERY - ADDTL 15 MIN: Performed by: SURGERY

## 2022-09-26 RX ORDER — OXYCODONE HYDROCHLORIDE 5 MG/1
5 TABLET ORAL EVERY 4 HOURS PRN
Status: DISCONTINUED | OUTPATIENT
Start: 2022-09-26 | End: 2022-09-27 | Stop reason: HOSPADM

## 2022-09-26 RX ORDER — FENTANYL CITRATE 50 UG/ML
INJECTION, SOLUTION INTRAMUSCULAR; INTRAVENOUS PRN
Status: DISCONTINUED | OUTPATIENT
Start: 2022-09-26 | End: 2022-09-26 | Stop reason: SDUPTHER

## 2022-09-26 RX ORDER — MIDAZOLAM HYDROCHLORIDE 1 MG/ML
INJECTION INTRAMUSCULAR; INTRAVENOUS PRN
Status: DISCONTINUED | OUTPATIENT
Start: 2022-09-26 | End: 2022-09-26 | Stop reason: SDUPTHER

## 2022-09-26 RX ORDER — IODIXANOL 320 MG/ML
INJECTION, SOLUTION INTRAVASCULAR
Status: DISPENSED
Start: 2022-09-26 | End: 2022-09-27

## 2022-09-26 RX ORDER — SODIUM CHLORIDE 9 MG/ML
INJECTION, SOLUTION INTRAVENOUS CONTINUOUS
Status: DISCONTINUED | OUTPATIENT
Start: 2022-09-26 | End: 2022-09-26

## 2022-09-26 RX ORDER — SODIUM CHLORIDE, SODIUM LACTATE, POTASSIUM CHLORIDE, CALCIUM CHLORIDE 600; 310; 30; 20 MG/100ML; MG/100ML; MG/100ML; MG/100ML
INJECTION, SOLUTION INTRAVENOUS CONTINUOUS
Status: DISCONTINUED | OUTPATIENT
Start: 2022-09-26 | End: 2022-09-27

## 2022-09-26 RX ORDER — SODIUM CHLORIDE 0.9 % (FLUSH) 0.9 %
5-40 SYRINGE (ML) INJECTION EVERY 12 HOURS SCHEDULED
Status: DISCONTINUED | OUTPATIENT
Start: 2022-09-26 | End: 2022-09-27 | Stop reason: HOSPADM

## 2022-09-26 RX ORDER — FENTANYL CITRATE 50 UG/ML
25 INJECTION, SOLUTION INTRAMUSCULAR; INTRAVENOUS EVERY 5 MIN PRN
Status: DISCONTINUED | OUTPATIENT
Start: 2022-09-26 | End: 2022-09-27 | Stop reason: HOSPADM

## 2022-09-26 RX ORDER — KETAMINE HYDROCHLORIDE 10 MG/ML
INJECTION, SOLUTION INTRAMUSCULAR; INTRAVENOUS PRN
Status: DISCONTINUED | OUTPATIENT
Start: 2022-09-26 | End: 2022-09-26 | Stop reason: SDUPTHER

## 2022-09-26 RX ORDER — SODIUM CHLORIDE 9 MG/ML
INJECTION, SOLUTION INTRAVENOUS PRN
Status: DISCONTINUED | OUTPATIENT
Start: 2022-09-26 | End: 2022-09-27 | Stop reason: HOSPADM

## 2022-09-26 RX ORDER — ACETAMINOPHEN 325 MG/1
650 TABLET ORAL
Status: DISCONTINUED | OUTPATIENT
Start: 2022-09-26 | End: 2022-09-27 | Stop reason: HOSPADM

## 2022-09-26 RX ORDER — LIDOCAINE HYDROCHLORIDE 10 MG/ML
INJECTION, SOLUTION INFILTRATION; PERINEURAL
Status: DISPENSED
Start: 2022-09-26 | End: 2022-09-27

## 2022-09-26 RX ORDER — BUSPIRONE HYDROCHLORIDE 15 MG/1
15 TABLET ORAL 3 TIMES DAILY
Status: DISCONTINUED | OUTPATIENT
Start: 2022-09-27 | End: 2022-09-27 | Stop reason: HOSPADM

## 2022-09-26 RX ORDER — SODIUM CHLORIDE 0.9 % (FLUSH) 0.9 %
5-40 SYRINGE (ML) INJECTION PRN
Status: DISCONTINUED | OUTPATIENT
Start: 2022-09-26 | End: 2022-09-27 | Stop reason: HOSPADM

## 2022-09-26 RX ORDER — ENOXAPARIN SODIUM 150 MG/ML
1 INJECTION SUBCUTANEOUS 2 TIMES DAILY
Status: DISCONTINUED | OUTPATIENT
Start: 2022-09-26 | End: 2022-09-27

## 2022-09-26 RX ORDER — PROPOFOL 10 MG/ML
INJECTION, EMULSION INTRAVENOUS
Status: DISPENSED
Start: 2022-09-26 | End: 2022-09-27

## 2022-09-26 RX ADMIN — KETAMINE HYDROCHLORIDE 10 MG: 10 INJECTION INTRAMUSCULAR; INTRAVENOUS at 18:12

## 2022-09-26 RX ADMIN — ENOXAPARIN SODIUM 120 MG: 150 INJECTION SUBCUTANEOUS at 22:26

## 2022-09-26 RX ADMIN — FENTANYL CITRATE 25 MCG: 50 INJECTION, SOLUTION INTRAMUSCULAR; INTRAVENOUS at 18:18

## 2022-09-26 RX ADMIN — HEPARIN SODIUM 4720 UNITS: 1000 INJECTION INTRAVENOUS; SUBCUTANEOUS at 15:10

## 2022-09-26 RX ADMIN — KETAMINE HYDROCHLORIDE 10 MG: 10 INJECTION INTRAMUSCULAR; INTRAVENOUS at 17:57

## 2022-09-26 RX ADMIN — Medication 21 UNITS/KG/HR: at 08:44

## 2022-09-26 RX ADMIN — MIDAZOLAM 1 MG: 1 INJECTION INTRAMUSCULAR; INTRAVENOUS at 17:57

## 2022-09-26 RX ADMIN — FENTANYL CITRATE 25 MCG: 50 INJECTION, SOLUTION INTRAMUSCULAR; INTRAVENOUS at 18:12

## 2022-09-26 RX ADMIN — FENTANYL CITRATE 50 MCG: 50 INJECTION, SOLUTION INTRAMUSCULAR; INTRAVENOUS at 18:30

## 2022-09-26 RX ADMIN — SODIUM CHLORIDE, POTASSIUM CHLORIDE, SODIUM LACTATE AND CALCIUM CHLORIDE: 600; 310; 30; 20 INJECTION, SOLUTION INTRAVENOUS at 07:27

## 2022-09-26 RX ADMIN — MIDAZOLAM 1 MG: 1 INJECTION INTRAMUSCULAR; INTRAVENOUS at 17:36

## 2022-09-26 RX ADMIN — HEPARIN SODIUM 4720 UNITS: 1000 INJECTION INTRAVENOUS; SUBCUTANEOUS at 07:16

## 2022-09-26 RX ADMIN — SODIUM CHLORIDE, PRESERVATIVE FREE 10 ML: 5 INJECTION INTRAVENOUS at 22:28

## 2022-09-26 RX ADMIN — SODIUM CHLORIDE, PRESERVATIVE FREE 10 ML: 5 INJECTION INTRAVENOUS at 22:27

## 2022-09-26 RX ADMIN — KETAMINE HYDROCHLORIDE 10 MG: 10 INJECTION INTRAMUSCULAR; INTRAVENOUS at 18:01

## 2022-09-26 NOTE — PROGRESS NOTES
Division of Vascular Surgery         Progress Note      Name: Naima Currie  MRN: [id number]         Overnight Events:      No acute events overnight. Hospital Summary      Patient presented to ED transfer from [facility] 5 weeks out after ankle ORIF for tib/fib fracture. She presented with neck pain and right chest pain with inspiration. CT at outside facility showed bilateral pulmonary embolism, unchanged from 1 week prior. She was started on Eliquis, which she was compliant with twice daily. Reports no difficulties sleeping. Respirations slightly elevated overnight, 23-31/min. Subjective:     Patient examined bedside today. She states her neck discomfort is not present this morning. She has been ambulating to and from the restroom without difficulty. Not currently on supplemental oxygen. Denies any leg pain. Denies chest pain, SOB, fever, chills, nausea, or vomiting. She notes she has been NPO since last night. BNP/trops wnl. Her only pertinent past medical history if the recent pulmonary embolism and sleep apnea, for which she uses CPAP at home. Home medications include Effexor  mg, omeprazole delayed release 40 mg, Lamictal 150 mg, buspirone 15 mg, and Eliquis 5 mg BID. Current medications include continuous heparin IV 5-30 units/kg/hour. Allergy to Aripiprazole. Physical Exam:     Vitals:  BP 82/72   Pulse 73   Temp 98.2 °F (36.8 °C) (Oral)   Resp 21   Ht 5' 4\" (1.626 m)   Wt 257 lb 4.4 oz (116.7 kg)   SpO2 96%   BMI 44.16 kg/m²     Physical Exam  Constitutional: Patient lying supine in hospital bed. NAD, cooperative. HENT: NC/AT. Bilateral external ears without erythema or lesions. No rhinorrhea. Eyes: No ductal or lacrimal discharge, bilaterally. EOM intact, PERRLA. Neck: Trachea midline, supple. Cardiovascular: RRR per monitor   Pulmonary: Chest rise and fall symmetric without increased work of breathing. Integument: Skin warm and dry.  No jaundice, erythema,

## 2022-09-26 NOTE — PROGRESS NOTES
Physical Therapy        Physical Therapy Cancel Note      DATE: 2022    NAME: Dolores Garcia  MRN: [id number]   : 1985      Patient not seen this date for Physical Therapy due to:    Surgery/Procedure: Plan for thrombectomy this date, will check back post-op.        Electronically signed by Bard Julieth PT on 2022 at 8:36 AM

## 2022-09-26 NOTE — OP NOTE
Operative Note      Patient: Maye Scott  YOB: 1985  MRN: [id number]    Date of Procedure: 9/26/2022    Pre-Op Diagnosis: saddle pulmonary embolism with symtoms of shortness of breath on exertion    Post-Op Diagnosis: Same       Procedure:  1. Right common femoral venous access under ultrasound guidance. 2.  Placement of a catheter in the pulmonary artery with measurement of pulmonary artery pressures to be 34 mmHg. 3.  Pulmonary arteriography. 4.  Extirpation of clot from the pulmonary artery using the Inari T 24 catheter on the left upper and lower pulmonary arteries. 5.  Extirpation of clot from the pulmonary artery using the T 24 Inari catheter on the right upper and lower pulmonary arteries. 6.  Completion pulmonary arteriography. 7.  Measurement of pulmonary artery pressures following extirpation of clot at 26 mmHg. Surgeon(s):  MD Roosevelt Ruff [place]    Assistant:   * No surgical staff found *    Anesthesia: General    Estimated Blood Loss (mL): 80    Complications: None    Specimens:   * No specimens in log *    Implants:  * No implants in log *      Drains: * No LDAs found *    Findings: Extirpation of thrombus was achieved. 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 Completion pulmonary arteriography revealed no further thrombus within the artery. Peak systolic arterial pressure was in the pulmonary artery preextirpation was 34 mmHg. Post extirpation of clot the pressure was 26 mmHg during systole. Although there was ectopy there was no loss of blood pressure. She did not have evidence of hemoptysis. Detailed Description of Procedure:     Ms Maurice Suarez was brought to the catheterization suite for mechanical thrombectomy of bilateral pulmonary embolus with right heart strain. After appropriate timeout performed, bilateral groin sites prepped and draped in standard sterile fashion.   I began by evaluating the right common femoral vein under ultrasound, it was patent and compressible. Local anesthesia delivered and under ultrasound guidance using a micropuncture needle, the right common femoral vein was accessed, permanent ultrasound images were saved. The track was dilated and 2 proglide sutures placed in a preclose fashion. An 8-Swedish sheath inserted and flushed. With an amplatz wire in, I up sized to a 22-[employer]ish [employer] sheath and parked it in the inferior vena cava. Patient was maintained on heparin drip and 5000 units of heparin bolus given. Using an angled pig tail catheter I selectively catheterized the main pulmonary artery. Pulmonary artery pressures measured and recorded. Next I selected the right pulmonary artery and placed a stiff amplatz wire down into it. The Inari T24 FloTriever catheter was then positioned and mechanical thrombectomy performed of the right lower and upper pulmonary arteries with thrombus extirpated removing significant clot burden. Right lower and upper pulmonary artery angiogram performed, revealing brisk filling of the right pulmonary tree without filling defects. Next I selected the left lower pulmonary artery and with a stiff amplatz wire down, I repositioned the Inari T24 FloTriever catheter to the left side. Mechanical thrombectomy of the left lower and upper pulmonary arteries performed and thrombus extirpated removing significant clot burden. Left lower and upper pulmonary artery angiogram performed revealing brisk filling of the left pulmonary tree. PA pressure measured again and recorded. Please note that the Kenyatta Settler was used to return most of the blood removed back to the patient per standard protocol. The catheter and sheath were removed, and the right common femoral vein was successfully closed percutaneously from the previously placed proglide sutures. Dermabond and Band-Aid applied, patient tolerated procedure well and was transferred to the floor for recovery.      Electronically signed by Manjinder Gaffney MD on 9/26/2022 at 6:35 PM

## 2022-09-26 NOTE — ANESTHESIA POSTPROCEDURE EVALUATION
Department of Anesthesiology  Postprocedure Note    Patient: Cresencio Ormond  MRN: [id number]  [place]: 1985  Date of evaluation: 9/26/2022      Procedure Summary     Date: 09/26/22 Room / Location: [address]    Anesthesia Start: 1732 Anesthesia Stop: 1854    Procedure: FEMORAL THROMBECTOMY Diagnosis:       PE (pulmonary thromboembolism) ([address]      (PE)    Surgeons: Army Darron MD Responsible Provider: Claudene Clay, MD    Anesthesia Type: MAC ASA Status: 3          Anesthesia Type: No value filed.     Stephane Phase I:      Stephane Phase II:        Anesthesia Post Evaluation    Patient location during evaluation: bedside  Patient participation: complete - patient participated  Level of consciousness: awake  Airway patency: patent  Nausea & Vomiting: no nausea and no vomiting  Complications: no  Cardiovascular status: hemodynamically stable  Respiratory status: acceptable  Hydration status: stable  Comments: BP (!) 156/89   Pulse (!) 103   Temp 98.2 °F (36.8 °C) (Oral)   Resp 21   Ht 5' 4\" (1.626 m)   Wt 257 lb 4.4 oz (116.7 kg)   SpO2 93%   BMI 44.16 kg/m²

## 2022-09-26 NOTE — PROGRESS NOTES
Patients home meds not ordered on admission. Paged Dr. Michael Walsh to review. Home meds restarted.

## 2022-09-26 NOTE — PROGRESS NOTES
spasms. This morning her symptoms worsened and she went back to the [facility] ER. In the ED she was found to have bilateral PE on the CT scans unchanged from previous scans. She was started on heparin drip and transferred to [facility] for evaluation by vascular surgery. Currently in the ER patient is still complaining of right lateral chest wall pain exacerbated with inspiration. She also complains of shoulder and upper back pain more pronounced on the right side. She denies central chest pain, nausea and dizziness. Her saturation is 96% on ambient air with no tachypnea or tachycardia. Admission EKG nonsignificant, no right heart strain evidence. She also has a history of sleep apnea but she denies using CPAP at home. She has a positive psych history and use venlafaxine, lamotrigine and buspirone at home. She is already seen by vascular surgery and planning on surgical intervention. In the meantime patient continues to be on heparin drip and will get stat echo. OBJECTIVE     Vital Signs:  BP 82/72   Pulse 73   Temp 98.2 °F (36.8 °C) (Oral)   Resp 21   Ht 5' 4\" (1.626 m)   Wt 257 lb 4.4 oz (116.7 kg)   SpO2 96%   BMI 44.16 kg/m²     Temp (24hrs), Av.3 °F (36.8 °C), Min:97.9 °F (36.6 °C), Max:98.7 °F (37.1 °C)    No intake/output data recorded. Physical Exam  Constitutional:       Appearance: She is obese. HENT:      Head: Normocephalic and atraumatic. Nose: Nose normal.      Mouth/Throat:      Mouth: Mucous membranes are moist.      Pharynx: Oropharynx is clear. Eyes:      Extraocular Movements: Extraocular movements intact. Conjunctiva/sclera: Conjunctivae normal.      Pupils: Pupils are equal, round, and reactive to light. Cardiovascular:      Rate and Rhythm: Normal rate and regular rhythm. Pulses: Normal pulses. Heart sounds: Normal heart sounds. Pulmonary:      Breath sounds: No decreased air movement.  Examination of the right-middle field reveals rhonchi. Examination of the left-middle field reveals rhonchi. Examination of the right-lower field reveals decreased breath sounds. Examination of the left-lower field reveals rhonchi. Decreased breath sounds, rhonchi and rales present. Abdominal:      General: Bowel sounds are normal.      Palpations: Abdomen is soft. Musculoskeletal:         General: Normal range of motion. Cervical back: Normal range of motion and neck supple. Thoracic back: Spasms (upper back and shoulders) present. Right lower leg: Deformity (Surgical scar) present. Neurological:      General: No focal deficit present. Mental Status: She is alert and oriented to person, place, and time. Medications:  Scheduled Medications:    sodium chloride flush  5-40 mL IntraVENous 2 times per day     Continuous Infusions:    heparin (PORCINE) Infusion 19 Units/kg/hr (09/25/22 2326)    sodium chloride       PRN Medicationsheparin (porcine), 80 Units/kg, PRN  heparin (porcine), 40 Units/kg, PRN  sodium chloride flush, 5-40 mL, PRN  sodium chloride, , PRN  ondansetron, 4 mg, Q8H PRN   Or  ondansetron, 4 mg, Q6H PRN  polyethylene glycol, 17 g, Daily PRN  acetaminophen, 650 mg, Q6H PRN   Or  acetaminophen, 650 mg, Q6H PRN        Diagnostic Labs:  CBC:   Recent Labs     09/25/22  0537 09/26/22  0556   WBC 10.6 9.0   RBC 4.80 4.72   HGB 13.4 13.2   HCT 40.6 40.5   MCV 84.6 85.8   RDW 12.8 12.9    381     BMP:   Recent Labs     09/26/22  0556   *   K 4.2      CO2 24   BUN 12   CREATININE 0.74     BNP: No results for input(s): BNP in the last 72 hours. PT/INR:   Recent Labs     09/26/22  0556   PROTIME 10.4   INR 1.0     APTT:   Recent Labs     09/25/22  1519 09/25/22  2220 09/26/22  0556   APTT 38.4* 43.4* 44.5*     CARDIAC ENZYMES: No results for input(s): CKMB, CKMBINDEX, TROPONINI in the last 72 hours.     Invalid input(s): CKTOTAL;3  FASTING LIPID PANEL:No results found for: CHOL, HDL, TRIG  LIVER PROFILE:   Recent Labs     09/26/22  0556   AST 11   ALT 16   BILIDIR 0.1   BILITOT 0.3   ALKPHOS 90      MICROBIOLOGY: No results found for: CULTURE    Imaging:    XR CHEST (2 VW)    Result Date: 9/25/2022  Mild elevation of the right hemidiaphragm and mild right basilar atelectasis       ASSESSMENT & PLAN     IMPRESSION  This is a 42 y.o. female who presented with shortness of breath and chest pain and found to have bilateral PEs. Patient admitted for management and surgical intervention for bilateral PEs. ASSESSMENT / PLAN:      Pulmonary embolism, bilateral ([address]  - Initial saddle embolus on 09/19 s/p ORIF for right tibia-fibular fracture  - Was on Eliquis 5 mg twice daily post PE for last 1 week  - Repeat CT significant for bilateral PEs unchanged from previous scans  - Chest x-ray shows mild elevation of the right hemidiaphragm and mild right basilar atelectasis. - Started on heparin drip  - Vascular surgery on board, ordered stat echo and planning on surgical intervention today. We will follow for further recommendations  - Monitor oxygen saturation with pulse oximetry  - Monitor hemodynamics     Hx of TAMRA  - Diagnosed 2018  - Has CPAP at home but does not use      Anxiety  - As per patient, she use buspirone, venlafaxine and lamotrigine at home  - We will resume her home medications.        Diet: N.p.o. from midnight  DVT ppx: Heparin drip  GI ppx: Not indicated     PT/OT/SW: Consulted  Discharge Planning:  to assist with discharge planning      Sara Hoskins MD  Internal Medicine Resident, PGY-1  [place], [place].  9/26/2022, 6:55 AM

## 2022-09-26 NOTE — ANESTHESIA PRE PROCEDURE
Department of Anesthesiology  Preprocedure Note       Name:  Aretha Andrews   Age:  42 y.o.  :  1985                                          MRN:  [id number]         Date:  2022      Surgeon: Jovanna Coon):  Mar Barraza MD    Procedure: Procedure(s): FEMORAL THROMBECTOMY    Medications prior to admission:   Prior to Admission medications    Medication Sig Start Date End Date Taking?  Authorizing Provider   venlafaxine (EFFEXOR XR) 75 MG extended release capsule 225 mg 22  Yes Historical Provider, MD   omeprazole (PRILOSEC) 40 MG delayed release capsule 40 mg 22  Yes Historical Provider, MD   lamoTRIgine (LAMICTAL) 150 MG tablet 150 mg 22  Yes Historical Provider, MD   busPIRone (BUSPAR) 15 MG tablet 15 mg 8/10/22  Yes Historical Provider, MD   apixaban (ELIQUIS) 5 MG TABS tablet Take by mouth 2 times daily   Yes Historical Provider, MD       Current medications:    Current Facility-Administered Medications   Medication Dose Route Frequency Provider Last Rate Last Admin    lactated ringers infusion   IntraVENous Continuous Nonda Robert DO 75 mL/hr at 22 0727 New Bag at 22 0727    [START ON 2022] busPIRone (BUSPAR) tablet 15 mg  15 mg Oral TID Vanessa Ingram MD        lamoTRIgine (LAMICTAL) tablet 150 mg  150 mg Oral Daily MD Keya Roberts ON 2022] venlafaxine (EFFEXOR XR) extended release capsule 225 mg  225 mg Oral Daily with breakfast Vanessa Ingram MD        heparin (porcine) injection 9,430 Units  80 Units/kg IntraVENous PRN Jaye Kanner, DO        heparin (porcine) injection 4,720 Units  40 Units/kg IntraVENous PRN Jaye Kanner, DO   4,720 Units at 22 1510    heparin 25,000 units in dextrose 5 % 250 mL infusion (rate based)  5-30 Units/kg/hr IntraVENous Continuous Jaye Kanner, DO 27.1 mL/hr at 22 1508 23 Units/kg/hr at 22 1508    sodium chloride flush 0.9 % injection 5-40 mL  5-40 mL IntraVENous 2 times per day Ny Panda MD   10 mL at 09/25/22 2325    sodium chloride flush 0.9 % injection 5-40 mL  5-40 mL IntraVENous PRN Ny Panda MD        0.9 % sodium chloride infusion   IntraVENous PRN Ny Panda MD        ondansetron (ZOFRAN-ODT) disintegrating tablet 4 mg  4 mg Oral Q8H PRN Ny Panda MD        Or    ondansetron [facility]) injection 4 mg  4 mg IntraVENous Q6H PRN Ny Panda MD        polyethylene glycol [facility]) packet 17 g  17 g Oral Daily PRN Ny Panda MD        acetaminophen (TYLENOL) tablet 650 mg  650 mg Oral Q6H PRN Ny Panda MD        Or    acetaminophen (TYLENOL) suppository 650 mg  650 mg Rectal Q6H PRN Ny Panda MD           Allergies:     Allergies   Allergen Reactions    Aripiprazole      Restless and aggitated       Problem List:    Patient Active Problem List   Diagnosis Code    Bilateral pulmonary embolism (HCC) I26.99    Anxiety F41.9    Obstructive sleep apnea syndrome G47.33    Class 3 severe obesity in adult ([facility] [address] E66.01    Smoker F17.200       Past Medical History:        Diagnosis Date    Anticoagulated     ELIQUIS    Anxiety     Broken ankle 08/19/2022    Current smoker     Depression     H/O blood clots     s/p ankle surgery  /  acute bilateral saddle PE    EKOS / [facility]TICOAGULATED [facility]    Pulmonary embolism ([facility] [address] 09/25/2022    Sleep apnea in adult 2018    has cpap at home / does not use       Past Surgical History:        Procedure Laterality Date    ORIF ANKLE FRACTURE  08/24/2022    Tib/Fib fx    THROMBECTOMY  09/26/2022    DR TORRES       Social History:    Social History     Tobacco Use    Smoking status: Never    Smokeless tobacco: Never   Substance Use Topics    Alcohol use: Not on file                                Counseling given: Not Answered      Vital Signs (Current):   Vitals:    09/25/22 2330 09/26/22 0426 09/26/22 0718 09/26/22 1115   BP: 126/75 82/72 105/63 (!) 156/89   Pulse: 79 73 79 (!) 103   Resp: 25 21 21 21   Temp: 97.9 °F (36.6 °C) 98.2 °F (36.8 °C) 98.6 °F (37 °C) 98.2 °F (36.8 °C)   TempSrc: Oral Oral Oral Oral   SpO2: 96% 96% 97% 93%   Weight:       Height:                                                  BP Readings from Last 3 Encounters:   09/26/22 (!) 156/89       NPO Status:                                                                                 BMI:   Wt Readings from Last 3 Encounters:   09/25/22 257 lb 4.4 oz (116.7 kg)     Body mass index is 44.16 kg/m². CBC:   Lab Results   Component Value Date/Time    WBC 9.0 09/26/2022 05:56 AM    RBC 4.72 09/26/2022 05:56 AM    HGB 13.2 09/26/2022 05:56 AM    HCT 40.5 09/26/2022 05:56 AM    MCV 85.8 09/26/2022 05:56 AM    RDW 12.9 09/26/2022 05:56 AM     09/26/2022 05:56 AM       CMP:   Lab Results   Component Value Date/Time     09/26/2022 05:56 AM    K 4.2 09/26/2022 05:56 AM     09/26/2022 05:56 AM    CO2 24 09/26/2022 05:56 AM    BUN 12 09/26/2022 05:56 AM    CREATININE 0.74 09/26/2022 05:56 AM    GFRAA >60 09/26/2022 05:56 AM    LABGLOM >60 09/26/2022 05:56 AM    GLUCOSE 109 09/26/2022 05:56 AM    PROT 7.0 09/26/2022 05:56 AM    CALCIUM 9.4 09/26/2022 05:56 AM    BILITOT 0.3 09/26/2022 05:56 AM    ALKPHOS 90 09/26/2022 05:56 AM    AST 11 09/26/2022 05:56 AM    ALT 16 09/26/2022 05:56 AM       POC Tests: No results for input(s): POCGLU, POCNA, POCK, POCCL, POCBUN, POCHEMO, POCHCT in the last 72 hours. Coags:   Lab Results   Component Value Date/Time    PROTIME 10.4 09/26/2022 05:56 AM    INR 1.0 09/26/2022 05:56 AM    APTT 45.8 09/26/2022 01:52 PM       HCG (If Applicable): No results found for: PREGTESTUR, PREGSERUM, HCG, HCGQUANT     ABGs: No results found for: PHART, PO2ART, DHR5XLX, TMJ5TZY, BEART, R0YAZQMK     Type & Screen (If Applicable):  No results found for: LABABO, LABRH    Drug/Infectious Status (If Applicable):  No results found for: HIV, HEPCAB    COVID-19 Screening (If Applicable):  No results found for: [address] [facility]        Anesthesia Evaluation  Patient summary reviewed no history of anesthetic complications:   Airway: Mallampati: III  TM distance: >3 FB   Neck ROM: full  Mouth opening: > = 3 FB   Dental: normal exam         Pulmonary:normal exam    (+) sleep apnea:                             Cardiovascular:  Exercise tolerance: poor (<4 METS),               Echocardiogram reviewed               ROS comment: Normal RV function on echo. Neuro/Psych:   Negative Neuro/Psych ROS              GI/Hepatic/Renal: Neg GI/Hepatic/Renal ROS            Endo/Other:    (+) blood dyscrasia: anticoagulation therapy:., .                 Abdominal:   (+) obese,           Vascular:   + PE.        ROS comment: Recent orthopedic surgery. Diagnosed with PE post-operatively, currently anticoagulated. . Other Findings:           Anesthesia Plan      MAC     ASA 3       Induction: intravenous. Anesthetic plan and risks discussed with patient. Use of blood products discussed with patient whom consented to blood products. Plan discussed with CRNA.                     Sylvia Barnhart MD   9/26/2022

## 2022-09-26 NOTE — PROGRESS NOTES
Patient arrives to pcc room 8 from 540 pre-op with urine pregnancy completed / pain free. Anesthesia in to see. Zayra Peters from surgery notified that patient arrived and ready. Family at bedside. Assessment reviewed. Consent previously signed. Side rails up with call light to reach.   Tongue ring and pj bottoms removed and given to [address] sister in law

## 2022-09-27 VITALS
DIASTOLIC BLOOD PRESSURE: 78 MMHG | SYSTOLIC BLOOD PRESSURE: 113 MMHG | TEMPERATURE: 98.4 F | WEIGHT: 255.07 LBS | OXYGEN SATURATION: 97 % | BODY MASS INDEX: 43.55 KG/M2 | RESPIRATION RATE: 21 BRPM | HEART RATE: 101 BPM | HEIGHT: 64 IN

## 2022-09-27 LAB
ABSOLUTE EOS #: 0.21 K/UL (ref 0–0.44)
ABSOLUTE IMMATURE GRANULOCYTE: 0.03 K/UL (ref 0–0.3)
ABSOLUTE LYMPH #: 2.77 K/UL (ref 1.1–3.7)
ABSOLUTE MONO #: 0.75 K/UL (ref 0.1–1.2)
ANION GAP SERPL CALCULATED.3IONS-SCNC: 12 MMOL/L (ref 9–17)
BASOPHILS # BLD: 1 % (ref 0–2)
BASOPHILS ABSOLUTE: 0.06 K/UL (ref 0–0.2)
BUN BLDV-MCNC: 8 MG/DL (ref 6–20)
CALCIUM SERPL-MCNC: 9.5 MG/DL (ref 8.6–10.4)
CHLORIDE BLD-SCNC: 103 MMOL/L (ref 98–107)
CO2: 21 MMOL/L (ref 20–31)
CREAT SERPL-MCNC: 0.68 MG/DL (ref 0.5–0.9)
EOSINOPHILS RELATIVE PERCENT: 3 % (ref 1–4)
GFR AFRICAN AMERICAN: >60 ML/MIN
GFR NON-AFRICAN AMERICAN: >60 ML/MIN
GFR SERPL CREATININE-BSD FRML MDRD: ABNORMAL ML/MIN/{1.73_M2}
GLUCOSE BLD-MCNC: 106 MG/DL (ref 70–99)
HCT VFR BLD CALC: 40.2 % (ref 36.3–47.1)
HEMOGLOBIN: 13.5 G/DL (ref 11.9–15.1)
IMMATURE GRANULOCYTES: 0 %
LYMPHOCYTES # BLD: 34 % (ref 24–43)
MCH RBC QN AUTO: 28.2 PG (ref 25.2–33.5)
MCHC RBC AUTO-ENTMCNC: 33.6 G/DL (ref 28.4–34.8)
MCV RBC AUTO: 84.1 FL (ref 82.6–102.9)
MONOCYTES # BLD: 9 % (ref 3–12)
NRBC AUTOMATED: 0 PER 100 WBC
PDW BLD-RTO: 12.6 % (ref 11.8–14.4)
PLATELET # BLD: 271 K/UL (ref 138–453)
PMV BLD AUTO: 8.2 FL (ref 8.1–13.5)
POTASSIUM SERPL-SCNC: 3.8 MMOL/L (ref 3.7–5.3)
RBC # BLD: 4.78 M/UL (ref 3.95–5.11)
SEG NEUTROPHILS: 53 % (ref 36–65)
SEGMENTED NEUTROPHILS ABSOLUTE COUNT: 4.35 K/UL (ref 1.5–8.1)
SODIUM BLD-SCNC: 136 MMOL/L (ref 135–144)
WBC # BLD: 8.2 K/UL (ref 3.5–11.3)

## 2022-09-27 PROCEDURE — 97166 OT EVAL MOD COMPLEX 45 MIN: CPT

## 2022-09-27 PROCEDURE — 6370000000 HC RX 637 (ALT 250 FOR IP): Performed by: STUDENT IN AN ORGANIZED HEALTH CARE EDUCATION/TRAINING PROGRAM

## 2022-09-27 PROCEDURE — 97535 SELF CARE MNGMENT TRAINING: CPT

## 2022-09-27 PROCEDURE — 80048 BASIC METABOLIC PNL TOTAL CA: CPT

## 2022-09-27 PROCEDURE — 2580000003 HC RX 258: Performed by: STUDENT IN AN ORGANIZED HEALTH CARE EDUCATION/TRAINING PROGRAM

## 2022-09-27 PROCEDURE — 85025 COMPLETE CBC W/AUTO DIFF WBC: CPT

## 2022-09-27 PROCEDURE — 99232 SBSQ HOSP IP/OBS MODERATE 35: CPT | Performed by: INTERNAL MEDICINE

## 2022-09-27 PROCEDURE — 36415 COLL VENOUS BLD VENIPUNCTURE: CPT

## 2022-09-27 RX ADMIN — APIXABAN 10 MG: 5 TABLET, FILM COATED ORAL at 09:25

## 2022-09-27 RX ADMIN — BUSPIRONE HYDROCHLORIDE 15 MG: 15 TABLET ORAL at 09:22

## 2022-09-27 RX ADMIN — VENLAFAXINE HYDROCHLORIDE 225 MG: 150 CAPSULE, EXTENDED RELEASE ORAL at 09:23

## 2022-09-27 RX ADMIN — LAMOTRIGINE 150 MG: 100 TABLET ORAL at 09:23

## 2022-09-27 RX ADMIN — SODIUM CHLORIDE, PRESERVATIVE FREE 10 ML: 5 INJECTION INTRAVENOUS at 10:00

## 2022-09-27 ASSESSMENT — PAIN SCALES - GENERAL
PAINLEVEL_OUTOF10: 0
PAINLEVEL_OUTOF10: 2

## 2022-09-27 NOTE — PROGRESS NOTES
RECOVERY PHASE INITIATED / PARS 9 as patient returns to [facility] room 8 alert and awake. Handoff at bedside with anesthesia. Right groin site clean soft and dry/bandaid intact. Some redness to abd fold above right puncture site. Assessment further reviewed. Heparin gtt continues. No complaints of pain. Fluids offered.   Side rails up with writer at bedside

## 2022-09-27 NOTE — PROGRESS NOTES
Patient discharged. Paperwork given and explained. IVs removed x3. Patient instructed to make follow up appointments and get jose from [address] in [facility]. Patient taken out in wheelchair. Left unit with all belongings.

## 2022-09-27 NOTE — FLOWSHEET NOTE
Pt hypotensive, pt stated ankle was numb, notified provider. Provider did assess at bedside, No new orders.

## 2022-09-27 NOTE — CARE COORDINATION
Transitional Planning  Spoke with patient. Plan to return home with brother. Has ride. Denies further needs.

## 2022-09-27 NOTE — PROGRESS NOTES
Division of Vascular Surgery         Progress Note      Name: Srini Soto  MRN: [id number]         Overnight Events:      Nurse noted slight hypotensive episode overnight, as well as acute left ankle numbness, vascular notified and assessed. Hospital Summary      Patient presented to ED transfer from [facility] 5 weeks out after ankle ORIF for tib/fib fracture. She presented with neck pain and right chest pain with inspiration. CT at outside facility showed bilateral pulmonary embolism, unchanged from 1 week prior. She was started on Eliquis, which she was compliant with twice daily. Reports no difficulties sleeping. On 9/26, patient underwent extirpation of clot from right and left upper and lower pulmonary arteries with completion of pulmonary arteriography, right common femoral venous access. Subjective:     Patient examined bedside today, awakened from sleep. Patient able to ambulate to restroom overnight, no issues with urination. Patient reports her breathing has improved. Notes feeling tired, attributed to long surgery day yesterday. Denies fever, chills, nausea, vomiting, CP, SOB, or new onset cough. Her only pertinent past medical history is the recent pulmonary embolism, and sleep apnea for which she uses CPAP at home. Current medications include buspirone 15 mg tab TID, Effexor  mg capsule PO am, Lovenox 1 mg/kg subQ BID, Lamictal 150 mg tab PO QD. Home medications include Effexor  mg, omeprazole delayed release 40 mg, Lamictal 150 mg, buspirone 15 mg, and Eliquis 5 mg BID. Allergy to Aripiprazole. Physical Exam:     Vitals:  /65   Pulse 72   Temp 98.4 °F (36.9 °C) (Oral)   Resp 20   Ht 5' 4\" (1.626 m)   Wt 257 lb 4.4 oz (116.7 kg)   SpO2 93%   BMI 44.16 kg/m²     Physical Exam  Constitutional: Patient lying supine in hospital bed, slightly tired as she was just awoken from sleep. NAD, cooperative. HENT: NC/AT.  Bilateral external ears without erythema or lesions. No rhinorrhea. Eyes: No ductal or lacrimal discharge, bilaterally. EOM intact, PERRLA. Neck: Trachea midline, supple. Cardiovascular: RRR per monitor. Pulmonary: Chest rise and fall symmetric without increased work of breathing. Integument: Skin warm and dry. No jaundice, erythema, rashes, lesions. Peripheral Vascular: No peripheral cyanosis noted. No lower extremity edema, bilaterally. Dorsalis pedis and tibialis posterior pulses 2+ bilaterally. Wound Site: Bandage in place, no drainage present. Minimal ecchymosis. Surrounding skin is soft. Neurological: A&Ox3, responsive to all verbal stimuli. Psychiatric: Mood and behavior normal.     Imaging/Labs:     CMP 9/27 05:06: Sodium 136 mmol/L, Potassium 3.8 mmol//L, Chloride 103 mmol/L, BUN 8 mg/dL, Creatinine 0.68 mg/dL, GFR>60 mL/min, Glucose 106 mg/Dl, Calcium 9.5 mg/dL  CBC 9/27 05:06: WBC 8.2 k/uL, RBC 4.78 m/uL, Hgb 13.5 g/dL, Hct 40.2%    Intraoperative Findings 9/26:   - Extirpation of saddle thrombus achieved. - No further thrombus via completion of pulmonary arteriography. - Peak systolic arterial pressure before extirpation was 34 mmHg; after extirpation of thrombus was 26 mmHg.   - No loss of blood pressure.  - No evidence of hemoptysis    CXR 2 Views 9/25: \"Mild elevation of the right hemidiaphragm and mild right basilar atelectasis\"    Assessment/Plan:     Ok to Transition to oral anticoagulation  Echo reviewed  Appreciate case management and social work assistance in determining affordability of oral anticoagulation options    Electronically signed by LILLI Lindo-S2 on 9/27/22 at 6:50 AM EDT

## 2022-09-27 NOTE — PROGRESS NOTES
History  Lives With: Family (Brother, SONIA, 2 nephews (8 and 10 yo))  Type of Home: House  Home Layout: Two level, Performs ADL's on one level, Able to Live on Main level with bedroom/bathroom  Bathroom Shower/Tub: Tub/Shower unit, Curtain  Bathroom Toilet: Standard  Bathroom Equipment: Grab bars in shower, Shower chair  Bathroom Accessibility: Accessible  Home Equipment: [id number] [employer], [employer], [employer], 4 wheeled  Receives Help From: Family  ADL Assistance: Independent  Homemaking Assistance: Needs assistance  Homemaking Responsibilities: No (Brother and brother's family performing all chores for past 5 weeks since pt injured RLE)  Ambulation Assistance: Independent (using 4ww mainly however)  Transfer Assistance: Independent  Active : No  Patient's  Info: Pt hasn't driven for past 5 weeks, Sister-In-Law assisting  Mode of Transportation: SUV, Family  Education: [employer]  Occupation: Full time employment  Type of Occupation: Factory-on medical leave currently  Leisure & Hobbies: shopping  Additional Comments: 24 hr sup available in home per pt       Objective   SpO2: 97 %   Room air          Safety Devices  Type of Devices: Gait belt;Call light within reach; Left in chair;Patient at risk for falls  Restraints  Restraints Initially in Place: No  Bed Mobility Training  Bed Mobility Training: Yes  Supine to Sit: Supervision  Sit to Supine: Other (comment) (Pt supine in bed upon arrival, retired sitting in recliner at exit)  Scooting: Modified independent  Balance  Sitting: Intact (mod I while sitting dynamically on EOB to don CAM boot and LB clothing, sat for ~8 min unsupported)  Standing: With support (SUP required, pt stood bedside/func mob using RW, R CAM boot on, stood for ~2 min only)  Gait  Overall Level of Assistance: Stand-by assistance (Func mob around room this date)  Base of Support: Narrowed  Speed/Roxann: Pace decreased (< 100 feet/min); Shuffled; Slow  Step Length: Right (09/27/22 1138)      Goals  Short Term Goals  Time Frame for Short term goals: Pt will by discharge  Short Term Goal 1: demo ADL UB/LB dressing/bathing activity at mod I, increased time  Short Term Goal 2: demo activity tolerance for 35 min+  Short Term Goal 3: demo/identify 2 EC/WS techniques during func activity with 1 vc  Short Term Goal 4: demo (I) with all bed mob/func transfers, including from toilet       Therapy Time   Individual Concurrent Group Co-treatment   Time In 0849         Time Out 0909         Minutes 20         Timed Code Treatment Minutes: [facility], OTR/L

## 2022-09-27 NOTE — PLAN OF CARE
Problem: Discharge Planning  Goal: Discharge to home or other facility with appropriate resources  Outcome: Completed  Flowsheets (Taken 9/27/2022 0800)  Discharge to home or other facility with appropriate resources: Identify barriers to discharge with patient and caregiver     Problem: Safety - Adult  Goal: Free from fall injury  Outcome: Completed     Problem: ABCDS Injury Assessment  Goal: Absence of physical injury  Outcome: Completed     Problem: Pain  Goal: Verbalizes/displays adequate comfort level or baseline comfort level  Outcome: Completed

## 2022-09-27 NOTE — DISCHARGE INSTRUCTIONS
start taking rivaroxaban 15 mg twice daily for 3 weeks and 20 mg daily there after as preScribed. Follow up with PCP and Vascular surgery after discharge. Pulmonary Embolism: Care Instructions  Overview     Pulmonary embolism is the sudden blockage of an artery in the lung. Blood clots in the deep veins of the leg or pelvis (deep vein thrombosis, or DVT) are the most common cause. These blood clots can travel to the lungs. Pulmonary embolism can be very serious. Because you have had one pulmonary embolism, you are at greater risk for having another one. But you can take steps to prevent another pulmonary embolism. You will probably take a prescription blood-thinning medicine to prevent blood clots. A blood thinner can stop a blood clot from growing larger and prevent new clots from forming. Follow-up care is a key part of your treatment and safety. Be sure to make and go to all appointments, and call your doctor if you are having problems. It's also a good idea to know your test results and keep a list of the medicines you take. How can you care for yourself at home? Take your medicines exactly as prescribed. Call your doctor if you think you are having a problem with your medicine. You will get more details on the specific medicines your doctor prescribes. If you are taking a blood thinner, be sure you get instructions about how to take your medicine safely. Blood thinners can cause serious bleeding problems. Try to walk several times a day. Walking helps keep blood moving in your legs. Before doing other types of exercise, ask your doctor what type and level of exercise is safe for you. Take steps to help prevent blood clots in your legs. For example:  Exercise your lower leg muscles if you sit for long periods of time. Pump your feet up and down by pulling your toes up toward your knees then pointing them down. Repeat. After an illness or surgery, try to get up and out of bed often.  If you can't get out of bed, flex your feet every hour to keep the blood moving through your legs. Take plenty of breaks when you travel. On long car trips, stop the car and walk around every hour or so. On the bus, plane, or train, get out of your seat and walk up and down the aisle every hour, if you can. Wear compression stockings if your doctor recommends them. Check with your doctor about whether you should use hormonal forms of birth control or hormone therapy. These may increase your risk of blood clots. Have a healthy lifestyle. This includes being active, staying at a healthy weight, and not smoking. When should you call for help? Call 911 anytime you think you may need emergency care. For example, call if:    You have shortness of breath. You have chest pain. You passed out (lost consciousness). You cough up blood. Call your doctor now or seek immediate medical care if:    You have new or worsening pain or swelling in your leg. Watch closely for changes in your health, and be sure to contact your doctor if:    You do not get better as expected. Where can you learn more? Go to [email or web address].[email or web address].[email or web address] org and sign in to your GamePress account. Enter E282 in the [facility] box to learn more about \"Pulmonary Embolism: Care Instructions. \"     If you do not have an account, please click on the \"Sign Up Now\" link. Current as of: March 28, 2022               Content Version: 13.4  © 2006-2022 [employer]. Care instructions adapted under license by [facility] ([facility]). If you have questions about a medical condition or this instruction, always ask your healthcare professional. [place] [address] any warranty or liability for your use of this information. 8 Things You Can Do to Help Prevent Blood Clots  A blood clot in a deep vein, usually in the legs, is called a deep vein thrombosis (DVT).  A DVT can break loose and travel through the bloodstream to the lungs. Then the clot can block blood flow in the lungs (pulmonary embolism). This can be life-threatening. That's why it's important to take steps to prevent a clot. Take a blood-thinning medicine (called an anticoagulant), if prescribed. If your doctor prescribes medicine, take it as directed. Exercise your lower leg muscles. This helps keep the blood moving through your legs. Pump your feet up and down by pulling your toes up toward your knees then pointing them down. Repeat. This is a good exercise to do when you are sitting for long periods of time. Get up out of bed as soon as your doctor says it's okay. Being active is one of the best things you can do to prevent clots. Take plenty of breaks when you travel. On long car trips, stop the car and walk around every hour or so. On the bus, plane, or train, get out of your seat and walk up and down the aisle every hour, if you can. Be active. Try to get 30 minutes or more of activity on most days of the week. Don't smoke. Smoking can increase your risk of blood clots. If you need help quitting, talk to your doctor about stop-smoking programs and medicines. Check with your doctor about whether you should use hormone forms of birth control or hormone therapy. These may increase your risk of blood clots. Use compression stockings if your doctor prescribes them. These are specially fitted stockings that may prevent blood clots by keeping blood from pooling in your legs. Current as of: March 28, 2022               Content Version: 13.4  © 2006-2022 Healthwise, [facility]. Care instructions adapted under license by [facility] ([facility]). If you have questions about a medical condition or this instruction, always ask your healthcare professional. [place] [address] any warranty or liability for your use of this information.            rivaroxaban  Pronunciation:  JASON a MALIKA a ban  Brand:  Xarelto  What is the most important information I should know about rivaroxaban? Do not stop taking rivaroxaban without first talking to your doctor. Stopping suddenly can increase your risk of blood clot or stroke. Call your doctor at once if you have signs of bleeding such as: headaches, feeling very weak or dizzy, bleeding gums, nosebleeds, heavy menstrual periods or abnormal vaginal bleeding, blood in your urine, bloody or tarry stools, coughing up blood or vomit that looks like coffee grounds. Many other drugs can increase your risk of bleeding when used with rivaroxaban. Tell your doctor about all medicines you have recently used. Rivaroxaban can cause a very serious blood clot around your spinal cord if you undergo a spinal tap or receive spinal anesthesia (epidural). Tell any doctor who treats you that you are taking rivaroxaban. What is rivaroxaban? Rivaroxaban is used to treat or prevent blood clots (venous thromboembolism, or VTE). Blood clots can occur in the legs (deep vein thrombosis, DVT) or the lungs (pulmonary embolism, PE). Blood clots can develop when you are very ill and cannot move around as much as normal, such as during or after a stay in the hospital. Blood clots may also develop after knee or hip replacement surgery. Rivaroxaban is sometimes used to lower your risk of a blood clot coming back after you have received treatment for blood clots for at least 6 months. Rivaroxaban is also used in people with atrial fibrillation (a heart rhythm disorder) to lower the risk of stroke caused by a blood clot. Rivaroxaban is also given together with aspirin to lower the risk of stroke, heart attack, or other serious heart problems in people with coronary artery disease (decreased blood flow to the heart) or peripheral artery disease (decreased blood flow to the legs). Rivaroxaban may also be used for purposes not listed in this medication guide.   What should I discuss with my healthcare provider before taking rivaroxaban? You should not use rivaroxaban if you are allergic to it, or if you have active or uncontrolled bleeding. Rivaroxaban can cause a very serious blood clot around your spinal cord if you undergo a spinal tap or receive spinal anesthesia (epidural). This type of blood clot could cause long-term paralysis, and may be more likely to occur if:    you have a genetic spinal defect;  you have a spinal catheter in place;  you have a history of spinal surgery or repeated spinal taps;  you have recently had a spinal tap or epidural anesthesia;  you are taking an NSAID--Advil, Aleve, Motrin, and others; or  you are using other medicines to treat or prevent blood clots. Rivaroxaban may cause you to bleed more easily, especially if you have:   a bleeding disorder that is inherited or caused by disease;  hemorrhagic stroke;  uncontrolled high blood pressure;  stomach or intestinal bleeding or ulcer; or  if you take certain medicines such as aspirin, enoxaparin, heparin, warfarin (Coumadin, Tammy Benne), clopidogrel (Plavix), or certain antidepressants. Tell your doctor if you have ever had:  antiphospholipid syndrome (also called Carrasco syndrome or \"sticky blood syndrome\"), an immune system disorder that increases the risk of blood clots;  an artificial heart valve; or  liver or kidney disease. Taking rivaroxaban during pregnancy may cause bleeding in the mother or the unborn baby. Tell your doctor if you are pregnant or plan to become pregnant. It may not be safe to breastfeed a baby while you are using this medicine. Ask your doctor about any risks. How should I take rivaroxaban? Follow all directions on your prescription label and read all medication guides or instruction sheets. Your doctor may occasionally change your dose. Use the medicine exactly as directed. The number of times you take rivaroxaban each day will depend on the reason you are using this medication.   For some conditions, rivaroxaban should be taken with food. Whether you take the medicine with or without food may also depend on the tablet strength you take. Follow your doctor's dosing instructions very carefully. Tell your doctor if you have trouble swallowing a rivaroxaban tablet. Tell any doctor who treats you that you are using rivaroxaban. If you need surgery or dental work, tell the surgeon or dentist ahead of time that you are using this medication. If you need anesthesia for a medical procedure or surgery, you may need to stop using rivaroxaban for a short time. Do not change your dose or stop taking this medication without first talking to your doctor. Stopping suddenly can increase your risk of blood clot or stroke. Store at room temperature away from moisture and heat. What happens if I miss a dose? If you take rivaroxaban 1 time each day: Take the medicine as soon as you remember, and then go back to your regular schedule. Do not take two doses in the same day. If you take the 2.5-milligram tablet 2 times each day: Skip the missed dose and take your next dose at the regular time. If you take the 15-milligram tablet 2 times each day: Take the missed dose on the same day you remember it. You may take 2 doses at the same time to make up a missed dose. Get your prescription refilled before you run out of medicine completely. What happens if I overdose? Seek emergency medical attention or call the Poison Help line at 1-811.534.8266. Overdose may cause excessive bleeding. What should I avoid while taking rivaroxaban? Avoid activities that may increase your risk of bleeding or injury. Use extra care to prevent bleeding while shaving or brushing your teeth. What are the possible side effects of rivaroxaban? Get emergency medical help if you have signs of an allergic reaction: hives; difficult breathing; swelling of your face, lips, tongue, or throat.   Also seek emergency medical attention if you have symptoms of a spinal blood clot: back pain, numbness or muscle weakness in your lower body, or loss of bladder or bowel control. Rivaroxaban can cause you to bleed more easily. Call your doctor at once if you have signs of bleeding such as:  easy bruising or bleeding (nosebleeds, bleeding gums, heavy menstrual bleeding);  pain, swelling, new drainage, or excessive bleeding from a wound or where a needle was injected in your skin;  any bleeding that will not stop;  headaches, dizziness, weakness, feeling like you might pass out;  urine that looks red, pink, or brown; or  bloody or tarry stools, coughing up blood or vomit that looks like coffee grounds. Bleeding is the most common side effect of rivaroxaban. This is not a complete list of side effects and others may occur. Call your doctor for medical advice about side effects. You may report side effects to FDA at 2-152-FDA-7435. What other drugs will affect rivaroxaban? Sometimes it is not safe to use certain medications at the same time. Some drugs can affect your blood levels of other drugs you take, which may increase side effects or make the medications less effective. Other drugs may affect rivaroxaban, including prescription and over-the-counter medicines, vitamins, and herbal products. Tell your doctor about all your current medicines and any medicine you start or stop using. Where can I get more information? Your pharmacist can provide more information about rivaroxaban. Remember, keep this and all other medicines out of the reach of children, never share your medicines with others, and use this medication only for the indication prescribed. Every effort has been made to ensure that the information provided by [address] is accurate, up-to-date, and complete, but no guarantee is made to that effect. Drug information contained herein may be time sensitive.  [employer] information has been compiled for use by healthcare practitioners and consumers in the [place] and therefore [employer] does not warrant that uses outside of the [place] are appropriate, unless specifically indicated otherwise. [facility] drug information does not endorse drugs, diagnose patients or recommend therapy. [facility] drug information is an informational resource designed to assist licensed healthcare practitioners in caring for their patients and/or to serve consumers viewing this service as a supplement to, and not a substitute for, the expertise, skill, knowledge and judgment of healthcare practitioners. The absence of a warning for a given drug or drug combination in no way should be construed to indicate that the drug or drug combination is safe, effective or appropriate for any given patient. [facility] does not assume any responsibility for any aspect of healthcare administered with the aid of information [facility] provides. The information contained herein is not intended to cover all possible uses, directions, precautions, warnings, drug interactions, allergic reactions, or adverse effects. If you have questions about the drugs you are taking, check with your doctor, nurse or pharmacist.  Copyright 0606-5412 [address] [place] [address] Dr ALVAREZ. Version: 8.01. Revision date: 6/12/2020. Care instructions adapted under license by [facility] ([facility]). If you have questions about a medical condition or this instruction, always ask your healthcare professional. [place] [address] any warranty or liability for your use of this information.

## 2022-09-27 NOTE — PROGRESS NOTES
[facility]  Internal Medicine Teaching Residency Program  Inpatient Daily Progress Note  ______________________________________________________________________________    Patient: Gary Pham  YOB: 1985   [id number]    Acct: [de-identified]     Room: 5/12-56  Admit date: 9/25/2022  Today's date: 09/27/22  Number of days in the hospital: 2    SUBJECTIVE   Admitting Diagnosis: Bilateral pulmonary embolism ([address]  CC: Shortness of breath, shoulder pain  Pt examined at bedside. Chart & results reviewed. No acute events overnight. Hemodynamically stable and afebrile. Maintaining good oxygen saturation on room air. Patient had extirpation of saddle embolus and pulmonary arteriography done by vascular surgery yesterday. Patient denies any chest pain and shortness of breath. On examination her patient nonsignificant, clear femoral access site palpable pulses bilateral extremities. Echo any acute findings LVEF > 55%. Patient has been started on Xarelto and will be continued as per vascular surgery recommendations. ROS:  Constitutional:  negative for chills, fevers, sweats  Respiratory:  Positive for shortness of breath. negative for cough, hemoptysis. Cardiovascular:  negative for chest pain, chest pressure/discomfort, lower extremity edema, palpitations  Gastrointestinal:  negative for abdominal pain, constipation, diarrhea, nausea, vomiting  Neurological:  negative for dizziness, headache  Musculoskeletal:  Positive for back pain  BRIEF HISTORY     The patient is a pleasant 42 y.o. female who presents as a transfer from [facility] due to bilateral PEs. Patient had right tibia-fibula fracture 5 weeks back for which she had ORIF on 08/24 at [facility]. Postprocedure she developed PE due to immobilization on 09/19 at which time she underwent EKOS procedure and was discharged on Eliquis 5 mg twice daily.      She was well until yesterday evening when she started having difficulty breathing and shoulder spasms. This morning her symptoms worsened and she went back to the [facility] ER. In the ED she was found to have bilateral PE on the CT scans unchanged from previous scans. She was started on heparin drip and transferred to [facility] for evaluation by vascular surgery. Currently in the ER patient is still complaining of right lateral chest wall pain exacerbated with inspiration. She also complains of shoulder and upper back pain more pronounced on the right side. She denies central chest pain, nausea and dizziness. Her saturation is 96% on ambient air with no tachypnea or tachycardia. Admission EKG nonsignificant, no right heart strain evidence. She also has a history of sleep apnea but she denies using CPAP at home. She has a positive psych history and use venlafaxine, lamotrigine and buspirone at home. She is already seen by vascular surgery and planning on surgical intervention. In the meantime patient continues to be on heparin drip and will get stat echo. OBJECTIVE     Vital Signs:  /78   Pulse (!) 101   Temp 98.4 °F (36.9 °C) (Oral)   Resp 21   Ht 5' 4\" (1.626 m)   Wt 255 lb 1.2 oz (115.7 kg)   SpO2 97%   BMI 43.78 kg/m²     Temp (24hrs), Av.3 °F (36.8 °C), Min:98.1 °F (36.7 °C), Max:98.4 °F (36.9 °C)    In: 780.2   Out: 1335 [Urine:1250]    Physical Exam  Constitutional:       Appearance: She is obese. HENT:      Head: Normocephalic and atraumatic. Nose: Nose normal.      Mouth/Throat:      Mouth: Mucous membranes are moist.      Pharynx: Oropharynx is clear. Eyes:      Extraocular Movements: Extraocular movements intact. Conjunctiva/sclera: Conjunctivae normal.      Pupils: Pupils are equal, round, and reactive to light. Cardiovascular:      Rate and Rhythm: Normal rate and regular rhythm. Pulses: Normal pulses. Heart sounds: Normal heart sounds. Pulmonary:      Breath sounds: No decreased air movement. Examination of the right-middle field reveals rhonchi. Examination of the left-middle field reveals rhonchi. Examination of the right-lower field reveals decreased breath sounds. Examination of the left-lower field reveals rhonchi. Decreased breath sounds, rhonchi and rales present. Abdominal:      General: Bowel sounds are normal.      Palpations: Abdomen is soft. Musculoskeletal:         General: Normal range of motion. Cervical back: Normal range of motion and neck supple. Thoracic back: Spasms (upper back and shoulders) present. Right lower leg: Deformity (Surgical scar) present. Neurological:      General: No focal deficit present. Mental Status: She is alert and oriented to person, place, and time.      Medications:  Scheduled Medications:    rivaroxaban  15 mg Oral BID WC    Followed by    Otilia Price ON 10/18/2022] rivaroxaban  20 mg Oral Dinner    busPIRone  15 mg Oral TID    lamoTRIgine  150 mg Oral Daily    venlafaxine  225 mg Oral Daily with breakfast    sodium chloride flush  5-40 mL IntraVENous 2 times per day    sodium chloride flush  5-40 mL IntraVENous 2 times per day     Continuous Infusions:    sodium chloride      sodium chloride       PRN Medicationssodium chloride flush, 5-40 mL, PRN  sodium chloride, , PRN  acetaminophen, 650 mg, Once PRN  fentanNYL, 25 mcg, Q5 Min PRN  oxyCODONE, 5 mg, Q4H PRN  sodium chloride flush, 5-40 mL, PRN  sodium chloride, , PRN  ondansetron, 4 mg, Q8H PRN   Or  ondansetron, 4 mg, Q6H PRN  polyethylene glycol, 17 g, Daily PRN  acetaminophen, 650 mg, Q6H PRN   Or  acetaminophen, 650 mg, Q6H PRN      Diagnostic Labs:  CBC:   Recent Labs     09/25/22  0537 09/26/22  0556 09/27/22  0506   WBC 10.6 9.0 8.2   RBC 4.80 4.72 4.78   HGB 13.4 13.2 13.5   HCT 40.6 40.5 40.2   MCV 84.6 85.8 84.1   RDW 12.8 12.9 12.6    381 271       BMP:   Recent Labs     09/26/22  0556 09/27/22  0506   * 136   K 4.2 3.8    103   CO2 24 21   BUN 12 8   CREATININE 0.74 0.68       BNP: No results for input(s): BNP in the last 72 hours. PT/INR:   Recent Labs     09/26/22  0556   PROTIME 10.4   INR 1.0       APTT:   Recent Labs     09/25/22  2220 09/26/22  0556 09/26/22  1352   APTT 43.4* 44.5* 45.8*       CARDIAC ENZYMES: No results for input(s): CKMB, CKMBINDEX, TROPONINI in the last 72 hours. Invalid input(s): CKTOTAL;3  FASTING LIPID PANEL:No results found for: CHOL, HDL, TRIG  LIVER PROFILE:   Recent Labs     09/26/22  0556   AST 11   ALT 16   BILIDIR 0.1   BILITOT 0.3   ALKPHOS 90        MICROBIOLOGY: No results found for: CULTURE    Imaging:    XR CHEST (2 VW)    Result Date: 9/25/2022  Mild elevation of the right hemidiaphragm and mild right basilar atelectasis       ASSESSMENT & PLAN     IMPRESSION  This is a 42 y.o. female who presented with shortness of breath and chest pain and found to have bilateral PEs. Patient admitted for management and surgical intervention for bilateral PEs. ASSESSMENT / PLAN:      Pulmonary embolism, bilateral ([address]  - Initial saddle embolus on 09/19 s/p ORIF for right tibia-fibular fracture  - Repeat CT significant for bilateral PEs unchanged from previous scans  - Chest x-ray shows mild elevation of the right hemidiaphragm and mild right basilar atelectasis. - Started on heparin drip  - Vascular surgery performed femoral thrombectomy with extirpation of saddle embolus and pulmonary arteriography done.  -Started on rivaroxaban 15 mg twice daily for 3 weeks and then 20 mg once daily for 1 year as per vascular recommendations. Hx of ATMRA  - Diagnosed 2018  - Has CPAP at home but does not use      Anxiety  - As per patient, she use buspirone, venlafaxine and lamotrigine at home  - We will resume her home medications.        Diet: Regular adult diet  DVT ppx: Foster Shaver  GI ppx: Not indicated     PT/OT/SW: Consulted  Discharge Planning: discharge home      Lauren Winn MD  Internal Medicine Resident, PGY-1  [facility],  [address], [place].  9/27/2022, 1:16 PM

## (undated) DEVICE — DRAPE,REIN 53X77,STERILE: Brand: MEDLINE

## (undated) DEVICE — CATHETER DIAG 6FR L110CM ID0.056IN TRILON VENT PGTL 145

## (undated) DEVICE — FLOWTRIEVER CATHETER AND GUIDE

## (undated) DEVICE — CATHETER ANGGRPHC 5FR DIA 125CML STNLSS STEEL LGTO HDRPHLC C

## (undated) DEVICE — FLOWSAVER: Brand: FLOWSAVER

## (undated) DEVICE — RADIFOCUS GLIDEWIRE ADVANTAGE GUIDEWIRE: Brand: GLIDEWIRE ADVANTAGE

## (undated) DEVICE — SUTURE PROL SZ 7-0 L30IN NONABSORBABLE BLU L9.3MM BV-1 1/2 8703H

## (undated) DEVICE — Device: Brand: TRIEVER24